# Patient Record
Sex: FEMALE | Race: WHITE | NOT HISPANIC OR LATINO | Employment: FULL TIME | URBAN - METROPOLITAN AREA
[De-identification: names, ages, dates, MRNs, and addresses within clinical notes are randomized per-mention and may not be internally consistent; named-entity substitution may affect disease eponyms.]

---

## 2019-03-24 ENCOUNTER — HOSPITAL ENCOUNTER (EMERGENCY)
Facility: OTHER | Age: 44
Discharge: HOME | End: 2019-03-24
Attending: EMERGENCY MEDICINE
Payer: COMMERCIAL

## 2019-03-24 ENCOUNTER — OFFICE VISIT (OUTPATIENT)
Dept: URGENT CARE | Facility: CLINIC | Age: 44
End: 2019-03-24
Payer: COMMERCIAL

## 2019-03-24 VITALS
OXYGEN SATURATION: 98 % | RESPIRATION RATE: 12 BRPM | WEIGHT: 220 LBS | BODY MASS INDEX: 30.8 KG/M2 | SYSTOLIC BLOOD PRESSURE: 107 MMHG | TEMPERATURE: 98 F | HEIGHT: 71 IN | HEART RATE: 97 BPM | DIASTOLIC BLOOD PRESSURE: 71 MMHG

## 2019-03-24 VITALS
WEIGHT: 220 LBS | DIASTOLIC BLOOD PRESSURE: 70 MMHG | BODY MASS INDEX: 30.8 KG/M2 | OXYGEN SATURATION: 97 % | RESPIRATION RATE: 18 BRPM | SYSTOLIC BLOOD PRESSURE: 127 MMHG | HEIGHT: 71 IN | HEART RATE: 81 BPM | TEMPERATURE: 99 F

## 2019-03-24 DIAGNOSIS — S49.91XA ACROMIOCLAVICULAR JOINT INJURY, RIGHT, INITIAL ENCOUNTER: Primary | ICD-10-CM

## 2019-03-24 DIAGNOSIS — S49.91XA RIGHT SHOULDER INJURY, INITIAL ENCOUNTER: Primary | ICD-10-CM

## 2019-03-24 LAB
B-HCG UR QL: NEGATIVE
CTP QC/QA: YES

## 2019-03-24 PROCEDURE — 73030 XR SHOULDER COMPLETE 2 OR MORE VIEWS RIGHT: ICD-10-PCS | Mod: FY,RT,S$GLB, | Performed by: RADIOLOGY

## 2019-03-24 PROCEDURE — 73030 X-RAY EXAM OF SHOULDER: CPT | Mod: FY,RT,S$GLB, | Performed by: RADIOLOGY

## 2019-03-24 PROCEDURE — 96372 THER/PROPH/DIAG INJ SC/IM: CPT

## 2019-03-24 PROCEDURE — 99204 OFFICE O/P NEW MOD 45 MIN: CPT | Mod: S$GLB,,, | Performed by: NURSE PRACTITIONER

## 2019-03-24 PROCEDURE — 25000003 PHARM REV CODE 250: Performed by: EMERGENCY MEDICINE

## 2019-03-24 PROCEDURE — 3008F BODY MASS INDEX DOCD: CPT | Mod: CPTII,S$GLB,, | Performed by: NURSE PRACTITIONER

## 2019-03-24 PROCEDURE — 99204 PR OFFICE/OUTPT VISIT, NEW, LEVL IV, 45-59 MIN: ICD-10-PCS | Mod: S$GLB,,, | Performed by: NURSE PRACTITIONER

## 2019-03-24 PROCEDURE — 63600175 PHARM REV CODE 636 W HCPCS: Performed by: EMERGENCY MEDICINE

## 2019-03-24 PROCEDURE — 3008F PR BODY MASS INDEX (BMI) DOCUMENTED: ICD-10-PCS | Mod: CPTII,S$GLB,, | Performed by: NURSE PRACTITIONER

## 2019-03-24 PROCEDURE — 99284 EMERGENCY DEPT VISIT MOD MDM: CPT | Mod: 25

## 2019-03-24 PROCEDURE — 81025 URINE PREGNANCY TEST: CPT | Performed by: EMERGENCY MEDICINE

## 2019-03-24 RX ORDER — AMLODIPINE BESYLATE 5 MG/1
TABLET ORAL
Refills: 1 | COMMUNITY
Start: 2018-12-31

## 2019-03-24 RX ORDER — OLMESARTAN MEDOXOMIL AND HYDROCHLOROTHIAZIDE 20/12.5 20; 12.5 MG/1; MG/1
TABLET ORAL
Refills: 0 | COMMUNITY
Start: 2019-02-24

## 2019-03-24 RX ORDER — IBUPROFEN AND FAMOTIDINE 800; 26.6 MG/1; MG/1
TABLET, COATED ORAL
Refills: 11 | COMMUNITY
Start: 2019-01-07 | End: 2019-03-24

## 2019-03-24 RX ORDER — IBUPROFEN 800 MG/1
800 TABLET ORAL EVERY 6 HOURS PRN
Qty: 30 TABLET | Refills: 0 | Status: SHIPPED | OUTPATIENT
Start: 2019-03-24

## 2019-03-24 RX ORDER — AZELASTINE HYDROCHLORIDE AND FLUTICASONE PROPIONATE 137; 50 UG/1; UG/1
SPRAY, METERED NASAL
Refills: 1 | COMMUNITY
Start: 2019-01-23

## 2019-03-24 RX ORDER — TIZANIDINE 4 MG/1
TABLET ORAL
Refills: 2 | COMMUNITY
Start: 2019-02-19

## 2019-03-24 RX ORDER — KETOROLAC TROMETHAMINE 30 MG/ML
30 INJECTION, SOLUTION INTRAMUSCULAR; INTRAVENOUS
Status: COMPLETED | OUTPATIENT
Start: 2019-03-24 | End: 2019-03-24

## 2019-03-24 RX ORDER — NORETHINDRONE, ETHINYL ESTRADIOL, AND FERROUS FUMARATE 0.8-25(24)
KIT ORAL
Refills: 1 | COMMUNITY
Start: 2019-01-18 | End: 2023-02-03 | Stop reason: SDUPTHER

## 2019-03-24 RX ORDER — OXYCODONE AND ACETAMINOPHEN 5; 325 MG/1; MG/1
2 TABLET ORAL
Status: COMPLETED | OUTPATIENT
Start: 2019-03-24 | End: 2019-03-24

## 2019-03-24 RX ORDER — HYDROCODONE BITARTRATE AND ACETAMINOPHEN 5; 325 MG/1; MG/1
1 TABLET ORAL EVERY 6 HOURS PRN
Qty: 10 TABLET | Refills: 0 | Status: SHIPPED | OUTPATIENT
Start: 2019-03-24

## 2019-03-24 RX ADMIN — KETOROLAC TROMETHAMINE 30 MG: 30 INJECTION, SOLUTION INTRAMUSCULAR; INTRAVENOUS at 06:03

## 2019-03-24 RX ADMIN — OXYCODONE HYDROCHLORIDE AND ACETAMINOPHEN 2 TABLET: 5; 325 TABLET ORAL at 06:03

## 2019-03-24 NOTE — PATIENT INSTRUCTIONS
"                              Orthopedic Discharge Instructions  If your condition worsens or fails to improve we recommend that you receive another evaluation at the ER immediately or contact your PCP to discuss your concerns or return here. You must understand that you've received an urgent care treatment only and that you may be released before all your medical problems are known or treated. You the patient will arrange for follouwp care as instructed.   Follow up with your Orthopedist within the next 48-72hrs for more evaluation and management of your condition.    If you were prescribed a narcotic or muscle relaxant do not drive or operate heavy machinery while taking these medications   Tylenol can also be used as directed for pain unless you have an allergy to them or medical condition such as stomach ulcers, kidney or liver disease or blood thinners etc for which you should not be taking these type of medications.   Avoid NSAIDs like Ibuprofen, Aleve, Advil, Motrin, etc.; as they can cause a delay in healing of the bone.    RICE which means rest, ice compression and elevation are helpful.   If you were given a splint wear it at all times.   If you were given crutches use them as we instructed. Do not rest your armpits on the foam pad or you risk compressing the nerves and the vessels there   You may take over the counter Calcium 1000 mg daily and Vitamin D3 600 IU daily for 4-6 weeks to help with the bone healing process. If you are not allergic or do not have any contraindications.     Narcotic Warning:  You were prescribed a narcotic medication to be taken for pain over "5 out of 10" on a pain scale of "0-10".  Please be aware as we discussed that narcotics can be addictive. I have given you a limited quantity to take as it is needed at this time. However take it sparingly and only when needed.    No driving, drinking or operating heavy machinery while taking this medication.     General Referral to " "[facility]  You were referred to [facility] to Establish Care and Management of your condition.  Please call [phone] to schedule your appointment.    Please return here or go to the Emergency Department for any concerns or worsening of condition.  Please follow up with your primary care doctor or specialist in the next 48-72hrs as needed.    If you  smoke, please stop smoking.    Parts of the Shoulder  The shoulder is the most flexible part of the body. The main joint in the shoulder is called the glenohumeral joint. This is where the arm bone (humerus) rests in a shallow socket called the glenoid. The bones in the shoulder are connected by ligaments, muscles, and other strong tissues. When the shoulder is healthy, you can move your arm in almost any direction (a full range of motion).    Date Last Reviewed: 9/10/2015  © 6145-4689 [employer]. [address] [place] [address]. All rights reserved. This information is not intended as a substitute for professional medical care. Always follow your healthcare professional's instructions.        Sling    A sling is designed to support your arm in a position of rest. It is used for injuries of the hand, forearm, upper arm, and shoulder.  A shoulder that is immobilized too long can become stiff and lose range of motion. Follow up with your doctor as advised and do not use the sling longer than directed.    Home use:  · Leave the sling in place as long as directed by your doctor. Unless told otherwise, you may remove it when bathing, dressing, and when you go to sleep.  · If approved by your health care provider, you can do gentle "pendulum exercises." To do these:  ¨ Remove your sling.  ¨ Stand or sit with your arm vertical and close to your side.  ¨ Relax your shoulder muscles and gently swing the arm forward and back, side to side, and in small circles.  ¨ Do this for about 5 minutes once or twice a day.  There should be only " minimal pain with this exercise. If you experience more than minimal discomfort, stop and call your health care provider.  · The sling is adjustable. If it becomes loose, adjust it so that your forearm is horizontal (level with the ground). Your hand should be level with the elbow.  Date Last Reviewed: 9/28/2015  © 0137-2452 [employer]. [address] [place] [address], [place] [address]. All rights reserved. This information is not intended as a substitute for professional medical care. Always follow your healthcare professional's instructions.

## 2019-03-24 NOTE — ED PROVIDER NOTES
"Encounter Date: 3/24/2019    SCRIBE #1 NOTE: I, Madina Alvarez, am scribing for, and in the presence of, Dr. Cloud.       History     Chief Complaint   Patient presents with    shoulder dislocation     states she went to [facility] on [facility] and was told she had R shoulder dislocation.  Pt states, "They were unable to put it back.  They said that if it was hurting to much I needed to go to the ER."  Pt states she hurt her shoulder s/p slip and fall     This is a 43 y.o. female who presents with complaint of right shoulder pain that began at 3:00 AM. The patient states she tripped getting out a car and fell onto her right shoulder. She was seen at [facility] and states she was told to come to the ED if she had increased pain because "thay can do more in the ED." Per patient, she was not clear on her primary diagnosis after leaving Urgent Care. She reports a bone is "poking out" of her shoulder. She also reports abrasion to right forehead. She denies fever, chills, nausea, vomiting, headache, numbness or tingling. Patient has a history of HTN, and is compliant with her medications. She denies history of diabetes, heart disease, or kidney disease.         Review of patient's allergies indicates:  No Known Allergies  Past Medical History:   Diagnosis Date    Allergy     Hypertension      Past Surgical History:   Procedure Laterality Date    GASTRIC BYPASS      KNEE SURGERY Right      Family History   Problem Relation Age of Onset    Hypertension Mother      Social History     Tobacco Use    Smoking status: Never Smoker    Smokeless tobacco: Never Used   Substance Use Topics    Alcohol use: Yes     Frequency: 2-4 times a month     Drinks per session: 1 or 2    Drug use: Never     Review of Systems   Constitutional: Negative for chills and fever.   HENT: Negative for congestion, sore throat and trouble swallowing.    Eyes: Negative for visual disturbance.   Respiratory: Negative for " cough and shortness of breath.    Cardiovascular: Negative for chest pain.   Gastrointestinal: Negative for abdominal pain, nausea and vomiting.   Endocrine: Negative.    Genitourinary: Negative for dysuria and hematuria.   Musculoskeletal: Positive for arthralgias.   Skin: Positive for wound.   Neurological: Negative for numbness and headaches.   Psychiatric/Behavioral: Negative.        Physical Exam     Initial Vitals [03/24/19 1712]   BP Pulse Resp Temp SpO2   137/84 96 16 98.7 °F (37.1 °C) 98 %      MAP       --         Physical Exam    Nursing note and vitals reviewed.  Constitutional: She appears well-developed and well-nourished.   HENT:   Head: Normocephalic.   Very small contusion right lateral forehead. No other cranial facial trauma.    Eyes: Conjunctivae and EOM are normal. Pupils are equal, round, and reactive to light.   Pulmonary/Chest: No respiratory distress.   Musculoskeletal:   Tenderness soft tissue and small abrasion over right superior shoulder. Prominent of right AC joint. Minor abrasion over superior scapula. No tenderness of remainder of clavicle. No deformity of shoulder joint. Elbow and wrist normal. NVID.   Neurological: She is alert and oriented to person, place, and time.   Skin: Skin is dry. No rash noted.   Psychiatric: She has a normal mood and affect. Her behavior is normal. Judgment and thought content normal.         ED Course   Procedures  Labs Reviewed   POCT URINE PREGNANCY          Imaging Results    None          Medical Decision Making:   Clinical Tests:   Lab Tests: Ordered and Reviewed  ED Management:  Shoulder X-ray obtain from Urgent care was independently interpreted: No fracture. No dislocation. Distal end of clavicle high riding, but no contralateral view available for comparison.            Scribe Attestation:   Scribe #1: I performed the above scribed service and the documentation accurately describes the services I performed. I attest to the accuracy of the  note.    Attending Attestation:           Physician Attestation for Scribe:  Physician Attestation Statement for Scribe #1: I, Dr. Cloud, reviewed documentation, as scribed by Madina Alvarez in my presence, and it is both accurate and complete.           Patient presents due to persistent right shoulder pain. Fell on it last night.  She thinks she landed on the lateral aspect of the shoulder with her arm against her chest, not a FOOSH injury.  She was seen at urgent care, x-ray was obtained and does not show dislocation or fracture.  The chart indicates that they were concerned for AC separation.  In talking with the patient she presented to the ER under the impression that there was a dislocation and that the ER might have to put something back in place.  She shows confusion about her diagnosis and discharge instructions.  I reviewed the images and concur.  Her exam does show greatest tenderness in the region of the AC and I suspect AC ligament strain/separation.  However no intervention is required she is already in a splint will be given IM an oral dose of analgesics here for improved pain relief encouraged follow-up with Orthopedics.  Return precautions discussed.             Clinical Impression:     1. Acromioclavicular joint injury, right, initial encounter                                 Clay Cloud II, MD  03/24/19 9497

## 2019-03-24 NOTE — PROGRESS NOTES
"Subjective:       Patient ID: Maria D Reid is a 43 y.o. female.    Vitals:  height is 5' 11" (1.803 m) and weight is 99.8 kg (220 lb). Her tympanic temperature is 98.2 °F (36.8 °C). Her blood pressure is 107/71 and her pulse is 97. Her respiration is 12 and oxygen saturation is 98%.     Chief Complaint: Shoulder Injury    Patient presents right shoulder pain due to an injury that occurred yesterday.  She states that she fell in the street, and hit her right shoulder against the concrete.  She has not tried any medication for her pain.     Shoulder Injury    The incident occurred in the street. The right shoulder is affected. The incident occurred 12 to 24 hours ago. The injury mechanism was a fall and a direct blow. The quality of the pain is described as stabbing and aching. The pain does not radiate. The pain is at a severity of 10/10. The pain is severe. Associated symptoms include numbness. Pertinent negatives include no chest pain, muscle weakness or tingling. The symptoms are aggravated by movement and overhead lifting. She has tried nothing for the symptoms.       Constitution: Negative for fatigue.   HENT: Negative for facial swelling and facial trauma.    Neck: Negative for neck stiffness.   Cardiovascular: Negative for chest trauma and chest pain.   Eyes: Negative for eye trauma, double vision and blurred vision.   Gastrointestinal: Negative for abdominal trauma, abdominal pain and rectal bleeding.   Genitourinary: Negative for hematuria, missed menses, genital trauma and pelvic pain.   Musculoskeletal: Positive for pain, trauma, joint pain and joint swelling. Negative for abnormal ROM of joint.   Skin: Negative for color change, wound, abrasion, laceration and bruising.   Neurological: Positive for numbness. Negative for dizziness, history of vertigo, light-headedness, coordination disturbances, altered mental status and loss of consciousness.   Hematologic/Lymphatic: Negative for history of bleeding " disorder.   Psychiatric/Behavioral: Negative for altered mental status.       Objective:      Physical Exam   Constitutional: She is oriented to person, place, and time. Vital signs are normal. She appears well-developed and well-nourished. She is cooperative.  Non-toxic appearance. She does not have a sickly appearance. She does not appear ill. No distress.   HENT:   Head: Normocephalic and atraumatic.   Right Ear: Hearing, tympanic membrane, external ear and ear canal normal.   Left Ear: Hearing, tympanic membrane, external ear and ear canal normal.   Nose: Nose normal. No mucosal edema, rhinorrhea or nasal deformity. No epistaxis. Right sinus exhibits no maxillary sinus tenderness and no frontal sinus tenderness. Left sinus exhibits no maxillary sinus tenderness and no frontal sinus tenderness.   Mouth/Throat: Uvula is midline, oropharynx is clear and moist and mucous membranes are normal. No trismus in the jaw. Normal dentition. No uvula swelling. No posterior oropharyngeal erythema.   Eyes: Pupils are equal, round, and reactive to light. Conjunctivae, EOM and lids are normal. Right eye exhibits no discharge. Left eye exhibits no discharge. No scleral icterus.   Sclera clear bilat   Neck: Trachea normal, normal range of motion, full passive range of motion without pain and phonation normal. Neck supple.   Cardiovascular: Normal rate, regular rhythm, S1 normal, S2 normal, normal heart sounds, intact distal pulses and normal pulses.   Pulmonary/Chest: Effort normal and breath sounds normal. No respiratory distress. She has no decreased breath sounds. She has no wheezes. She has no rhonchi. She has no rales.   Abdominal: Soft. Normal appearance and bowel sounds are normal. She exhibits no distension, no pulsatile midline mass and no mass. There is no tenderness.   Musculoskeletal: She exhibits no edema or deformity.        Right shoulder: She exhibits decreased range of motion, tenderness, bony tenderness, pain and  decreased strength. She exhibits no swelling, no effusion, no crepitus, no deformity, no laceration, no spasm and normal pulse.        Arms:  Lymphadenopathy:     She has no cervical adenopathy.   Neurological: She is alert and oriented to person, place, and time. She exhibits normal muscle tone. Coordination normal.   Skin: Skin is warm, dry and intact. No rash noted. She is not diaphoretic. No pallor.   Psychiatric: She has a normal mood and affect. Her speech is normal and behavior is normal. Judgment and thought content normal. Cognition and memory are normal.   Nursing note and vitals reviewed.                    Xr Shoulder Complete 2 Or More Views Right    Result Date: 3/24/2019  EXAMINATION: XR SHOULDER COMPLETE 2 OR MORE VIEWS RIGHT CLINICAL HISTORY: Unspecified injury of right shoulder and upper arm, initial encounter TECHNIQUE: Two or three views of the right shoulder were performed. COMPARISON: None FINDINGS: Three views. The right humeral head maintains appropriate relationship with the glenoid.  No acute displaced right rib fracture.  The right lung zones are grossly clear.  There is slight elevation of the acromioclavicular joint, correlation with any focal tenderness recommended.     1. No acute displaced fracture or dislocation of the glenohumeral joint. 2. Slight elevation of the right acromioclavicular joint, may reflect mild separation, correlation advised. Electronically signed by: Palmer Venegas MD Date:    03/24/2019 Time:    14:32  Assessment:       1. Right shoulder injury, initial encounter        Plan:       An urgent care referral to Orthopedics was ordered on today.  Left message for [facility] at 3:04pm  on 3/24/19 to follow up with my patient regarding scheduling an appointment.      Right shoulder injury, initial encounter  -     XR SHOULDER COMPLETE 2 OR MORE VIEWS RIGHT; Future; Expected date: 03/24/2019  -     Ambulatory referral to Orthopedics  -      "HYDROcodone-acetaminophen (NORCO) 5-325 mg per tablet; Take 1 tablet by mouth every 6 (six) hours as needed for Pain.  Dispense: 10 tablet; Refill: 0  -     SLING FOR HOME USE      Patient Instructions                                 Orthopedic Discharge Instructions  If your condition worsens or fails to improve we recommend that you receive another evaluation at the ER immediately or contact your PCP to discuss your concerns or return here. You must understand that you've received an urgent care treatment only and that you may be released before all your medical problems are known or treated. You the patient will arrange for follouwp care as instructed.   Follow up with your Orthopedist within the next 48-72hrs for more evaluation and management of your condition.    If you were prescribed a narcotic or muscle relaxant do not drive or operate heavy machinery while taking these medications   Tylenol can also be used as directed for pain unless you have an allergy to them or medical condition such as stomach ulcers, kidney or liver disease or blood thinners etc for which you should not be taking these type of medications.   Avoid NSAIDs like Ibuprofen, Aleve, Advil, Motrin, etc.; as they can cause a delay in healing of the bone.    RICE which means rest, ice compression and elevation are helpful.   If you were given a splint wear it at all times.   If you were given crutches use them as we instructed. Do not rest your armpits on the foam pad or you risk compressing the nerves and the vessels there   You may take over the counter Calcium 1000 mg daily and Vitamin D3 600 IU daily for 4-6 weeks to help with the bone healing process. If you are not allergic or do not have any contraindications.     Narcotic Warning:  You were prescribed a narcotic medication to be taken for pain over "5 out of 10" on a pain scale of "0-10".  Please be aware as we discussed that narcotics can be addictive. I have given you a limited " "quantity to take as it is needed at this time. However take it sparingly and only when needed.    No driving, drinking or operating heavy machinery while taking this medication.     General Referral to [facility]  You were referred to [facility] to Establish Care and Management of your condition.  Please call [phone] to schedule your appointment.    Please return here or go to the Emergency Department for any concerns or worsening of condition.  Please follow up with your primary care doctor or specialist in the next 48-72hrs as needed.    If you  smoke, please stop smoking.    Parts of the Shoulder  The shoulder is the most flexible part of the body. The main joint in the shoulder is called the glenohumeral joint. This is where the arm bone (humerus) rests in a shallow socket called the glenoid. The bones in the shoulder are connected by ligaments, muscles, and other strong tissues. When the shoulder is healthy, you can move your arm in almost any direction (a full range of motion).    Date Last Reviewed: 9/10/2015  © 1130-9956 [employer]. [address] [place] [address]. All rights reserved. This information is not intended as a substitute for professional medical care. Always follow your healthcare professional's instructions.        Sling    A sling is designed to support your arm in a position of rest. It is used for injuries of the hand, forearm, upper arm, and shoulder.  A shoulder that is immobilized too long can become stiff and lose range of motion. Follow up with your doctor as advised and do not use the sling longer than directed.    Home use:  · Leave the sling in place as long as directed by your doctor. Unless told otherwise, you may remove it when bathing, dressing, and when you go to sleep.  · If approved by your health care provider, you can do gentle "pendulum exercises." To do these:  ¨ Remove your sling.  ¨ Stand or sit with your arm vertical and " close to your side.  ¨ Relax your shoulder muscles and gently swing the arm forward and back, side to side, and in small circles.  ¨ Do this for about 5 minutes once or twice a day.  There should be only minimal pain with this exercise. If you experience more than minimal discomfort, stop and call your health care provider.  · The sling is adjustable. If it becomes loose, adjust it so that your forearm is horizontal (level with the ground). Your hand should be level with the elbow.  Date Last Reviewed: 9/28/2015 © 2000-2017 [employer]. [address] [place] [address]. All rights reserved. This information is not intended as a substitute for professional medical care. Always follow your healthcare professional's instructions.

## 2019-03-24 NOTE — ED TRIAGE NOTES
Pt presents to the ED with c/o right shoulder pain that started last night after a trip and fall. Pt states that she was seen at [facility] on [address] today and was told to come to ED if pain worsen. Pt states that she had 3 x-ray performed today at  and was told that she may have a torn ligament to left shoulder. Pt rates pain as 10/10. Pt arm placed in sling. Neurovascular intact.

## 2019-03-25 ENCOUNTER — OFFICE VISIT (OUTPATIENT)
Dept: ORTHOPEDICS | Facility: CLINIC | Age: 44
End: 2019-03-25
Payer: COMMERCIAL

## 2019-03-25 ENCOUNTER — HOSPITAL ENCOUNTER (OUTPATIENT)
Dept: RADIOLOGY | Facility: HOSPITAL | Age: 44
Discharge: HOME | End: 2019-03-25
Attending: PHYSICIAN ASSISTANT
Payer: COMMERCIAL

## 2019-03-25 VITALS
HEIGHT: 71 IN | SYSTOLIC BLOOD PRESSURE: 128 MMHG | WEIGHT: 223.44 LBS | DIASTOLIC BLOOD PRESSURE: 82 MMHG | BODY MASS INDEX: 31.28 KG/M2 | HEART RATE: 82 BPM

## 2019-03-25 DIAGNOSIS — M25.511 ACUTE PAIN OF RIGHT SHOULDER: Primary | ICD-10-CM

## 2019-03-25 DIAGNOSIS — M25.512 LEFT SHOULDER PAIN, UNSPECIFIED CHRONICITY: ICD-10-CM

## 2019-03-25 DIAGNOSIS — M25.311 INSTABILITY OF RIGHT SHOULDER JOINT: ICD-10-CM

## 2019-03-25 PROCEDURE — 73030 X-RAY EXAM OF SHOULDER: CPT | Mod: TC,LT

## 2019-03-25 PROCEDURE — 73030 XR SHOULDER COMPLETE 2 OR MORE VIEWS LEFT: ICD-10-PCS | Mod: 26,LT,, | Performed by: RADIOLOGY

## 2019-03-25 PROCEDURE — 99203 PR OFFICE/OUTPT VISIT, NEW, LEVL III, 30-44 MIN: ICD-10-PCS | Mod: S$GLB,,, | Performed by: PHYSICIAN ASSISTANT

## 2019-03-25 PROCEDURE — 3008F BODY MASS INDEX DOCD: CPT | Mod: CPTII,S$GLB,, | Performed by: PHYSICIAN ASSISTANT

## 2019-03-25 PROCEDURE — 99999 PR PBB SHADOW E&M-EST. PATIENT-LVL IV: CPT | Mod: PBBFAC,,, | Performed by: PHYSICIAN ASSISTANT

## 2019-03-25 PROCEDURE — 99999 PR PBB SHADOW E&M-EST. PATIENT-LVL IV: ICD-10-PCS | Mod: PBBFAC,,, | Performed by: PHYSICIAN ASSISTANT

## 2019-03-25 PROCEDURE — 99203 OFFICE O/P NEW LOW 30 MIN: CPT | Mod: S$GLB,,, | Performed by: PHYSICIAN ASSISTANT

## 2019-03-25 PROCEDURE — 73030 X-RAY EXAM OF SHOULDER: CPT | Mod: 26,LT,, | Performed by: RADIOLOGY

## 2019-03-25 PROCEDURE — 3008F PR BODY MASS INDEX (BMI) DOCUMENTED: ICD-10-PCS | Mod: CPTII,S$GLB,, | Performed by: PHYSICIAN ASSISTANT

## 2019-03-25 NOTE — LETTER
March 25, 2019      Ferny Patten, NP  [address] [facility] [address]           [facility]  [address]  [facility] [address]  Phone: [phone]          Patient: Maria D Reid   MR Number: [id number]   YOB: 1975   Date of Visit: 3/25/2019       Dear Ferny Patten:    Thank you for referring Maria D Reid to me for evaluation. Attached you will find relevant portions of my assessment and plan of care.    If you have questions, please do not hesitate to call me. I look forward to following Maria D Reid along with you.    Sincerely,    Rommel Arias PA-C    Enclosure  CC:  No Recipients    If you would like to receive this communication electronically, please contact [email or web address] or [phone] to request more information on [employer] Link access.    For providers and/or their staff who would like to refer a patient to [facility], please contact us through our one-stop-shop provider referral line, [facility], at [phone].    If you feel you have received this communication in error or would no longer like to receive these types of communications, please e-mail [email or web address]
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
## 2019-03-28 ENCOUNTER — HOSPITAL ENCOUNTER (OUTPATIENT)
Dept: RADIOLOGY | Facility: HOSPITAL | Age: 44
Discharge: HOME | End: 2019-03-28
Attending: PHYSICIAN ASSISTANT
Payer: COMMERCIAL

## 2019-03-28 DIAGNOSIS — M25.511 ACUTE PAIN OF RIGHT SHOULDER: ICD-10-CM

## 2019-03-28 DIAGNOSIS — M25.311 INSTABILITY OF RIGHT SHOULDER JOINT: ICD-10-CM

## 2019-03-28 PROCEDURE — 73221 MRI JOINT UPR EXTREM W/O DYE: CPT | Mod: TC,RT

## 2019-03-28 PROCEDURE — 73221 MRI JOINT UPR EXTREM W/O DYE: CPT | Mod: 26,RT,, | Performed by: RADIOLOGY

## 2019-03-28 PROCEDURE — 73221 MRI SHOULDER WITHOUT CONTRAST RIGHT: ICD-10-PCS | Mod: 26,RT,, | Performed by: RADIOLOGY

## 2019-04-02 ENCOUNTER — TELEPHONE (OUTPATIENT)
Dept: ORTHOPEDICS | Facility: CLINIC | Age: 44
End: 2019-04-02

## 2019-04-02 NOTE — TELEPHONE ENCOUNTER
----- Message from Rekha Aceves sent at 4/2/2019  8:41 AM CDT -----  Contact: self  Pt is returning a call from Shefali.     Please call pt back [phone]

## 2019-04-04 ENCOUNTER — OFFICE VISIT (OUTPATIENT)
Dept: SPORTS MEDICINE | Facility: CLINIC | Age: 44
End: 2019-04-04
Payer: COMMERCIAL

## 2019-04-04 ENCOUNTER — HOSPITAL ENCOUNTER (OUTPATIENT)
Dept: RADIOLOGY | Facility: HOSPITAL | Age: 44
Discharge: HOME | End: 2019-04-04
Attending: ORTHOPAEDIC SURGERY
Payer: COMMERCIAL

## 2019-04-04 VITALS
WEIGHT: 223 LBS | DIASTOLIC BLOOD PRESSURE: 85 MMHG | HEIGHT: 71 IN | BODY MASS INDEX: 31.22 KG/M2 | SYSTOLIC BLOOD PRESSURE: 146 MMHG | HEART RATE: 69 BPM

## 2019-04-04 DIAGNOSIS — M75.21 BICEPS TENDINITIS OF RIGHT UPPER EXTREMITY: ICD-10-CM

## 2019-04-04 DIAGNOSIS — M25.511 ACUTE PAIN OF RIGHT SHOULDER: ICD-10-CM

## 2019-04-04 DIAGNOSIS — M25.512 LEFT SHOULDER PAIN, UNSPECIFIED CHRONICITY: ICD-10-CM

## 2019-04-04 DIAGNOSIS — S43.51XA SPRAIN OF ACROMIOCLAVICULAR LIGAMENT OF RIGHT SHOULDER, INITIAL ENCOUNTER: ICD-10-CM

## 2019-04-04 DIAGNOSIS — M75.121 COMPLETE TEAR OF RIGHT ROTATOR CUFF: Primary | ICD-10-CM

## 2019-04-04 PROCEDURE — 3008F PR BODY MASS INDEX (BMI) DOCUMENTED: ICD-10-PCS | Mod: CPTII,S$GLB,, | Performed by: ORTHOPAEDIC SURGERY

## 2019-04-04 PROCEDURE — 99999 PR PBB SHADOW E&M-EST. PATIENT-LVL III: CPT | Mod: PBBFAC,,, | Performed by: ORTHOPAEDIC SURGERY

## 2019-04-04 PROCEDURE — 73050 XR ACROMIOCLAVICULAR JOINTS BILATERAL W WO WEIGHTS: ICD-10-PCS | Mod: 26,,, | Performed by: RADIOLOGY

## 2019-04-04 PROCEDURE — 99204 PR OFFICE/OUTPT VISIT, NEW, LEVL IV, 45-59 MIN: ICD-10-PCS | Mod: S$GLB,,, | Performed by: ORTHOPAEDIC SURGERY

## 2019-04-04 PROCEDURE — 99204 OFFICE O/P NEW MOD 45 MIN: CPT | Mod: S$GLB,,, | Performed by: ORTHOPAEDIC SURGERY

## 2019-04-04 PROCEDURE — 3008F BODY MASS INDEX DOCD: CPT | Mod: CPTII,S$GLB,, | Performed by: ORTHOPAEDIC SURGERY

## 2019-04-04 PROCEDURE — 73050 X-RAY EXAM OF SHOULDERS: CPT | Mod: TC,FY,PO

## 2019-04-04 PROCEDURE — 73050 X-RAY EXAM OF SHOULDERS: CPT | Mod: 26,,, | Performed by: RADIOLOGY

## 2019-04-04 PROCEDURE — 99999 PR PBB SHADOW E&M-EST. PATIENT-LVL III: ICD-10-PCS | Mod: PBBFAC,,, | Performed by: ORTHOPAEDIC SURGERY

## 2019-04-04 NOTE — LETTER
April 8, 2019      Rommel Arias PA-C  [address]  [facility] [address]           [facility]  [address]  [facility] [address]  Phone: [phone]          Patient: Maria D Reid   MR Number: [id number]   YOB: 1975   Date of Visit: 4/4/2019       Dear Rommel Arias:    Thank you for referring Maria D Reid to me for evaluation. Attached you will find relevant portions of my assessment and plan of care.    If you have questions, please do not hesitate to call me. I look forward to following Maria D Reid along with you.    Sincerely,    JEREMÍAS Milligan MD    Enclosure  CC:  No Recipients    If you would like to receive this communication electronically, please contact [email or web address] or [phone] to request more information on [facility] access.    For providers and/or their staff who would like to refer a patient to [facility], please contact us through our one-stop-shop provider referral line, Sarahy Dugan, at [phone].    If you feel you have received this communication in error or would no longer like to receive these types of communications, please e-mail [email or web address]
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
## 2019-04-05 DIAGNOSIS — S46.011A STRAIN OF RIGHT ROTATOR CUFF CAPSULE, INITIAL ENCOUNTER: Primary | ICD-10-CM

## 2019-04-16 ENCOUNTER — OFFICE VISIT (OUTPATIENT)
Dept: SPORTS MEDICINE | Facility: CLINIC | Age: 44
End: 2019-04-16
Payer: COMMERCIAL

## 2019-04-16 VITALS
HEART RATE: 83 BPM | SYSTOLIC BLOOD PRESSURE: 133 MMHG | WEIGHT: 223 LBS | BODY MASS INDEX: 33.8 KG/M2 | DIASTOLIC BLOOD PRESSURE: 99 MMHG | HEIGHT: 68 IN

## 2019-04-16 DIAGNOSIS — M75.111 INCOMPLETE TEAR OF RIGHT ROTATOR CUFF: Primary | ICD-10-CM

## 2019-04-16 PROCEDURE — 99499 UNLISTED E&M SERVICE: CPT | Mod: S$GLB,,, | Performed by: PHYSICIAN ASSISTANT

## 2019-04-16 PROCEDURE — 99999 PR PBB SHADOW E&M-EST. PATIENT-LVL III: CPT | Mod: PBBFAC,,, | Performed by: PHYSICIAN ASSISTANT

## 2019-04-16 PROCEDURE — 99999 PR PBB SHADOW E&M-EST. PATIENT-LVL III: ICD-10-PCS | Mod: PBBFAC,,, | Performed by: PHYSICIAN ASSISTANT

## 2019-04-16 PROCEDURE — 99499 NO LOS: ICD-10-PCS | Mod: S$GLB,,, | Performed by: PHYSICIAN ASSISTANT

## 2019-04-16 RX ORDER — OXYCODONE HYDROCHLORIDE 5 MG/1
5-10 TABLET ORAL EVERY 6 HOURS PRN
Qty: 28 TABLET | Refills: 0 | Status: SHIPPED | OUTPATIENT
Start: 2019-04-16

## 2019-04-16 RX ORDER — ASPIRIN 81 MG/1
81 TABLET ORAL 2 TIMES DAILY
Qty: 28 TABLET | Refills: 0 | Status: SHIPPED | OUTPATIENT
Start: 2019-04-16 | End: 2019-04-30

## 2019-04-16 RX ORDER — ONDANSETRON 4 MG/1
4 TABLET, FILM COATED ORAL EVERY 8 HOURS PRN
Qty: 30 TABLET | Refills: 0 | Status: SHIPPED | OUTPATIENT
Start: 2019-04-16 | End: 2019-04-23

## 2019-04-16 NOTE — H&P (VIEW-ONLY)
Maria D Reid  is here for a completion of her perioperative paperwork. she  Is scheduled to undergo  RIGHT shoulder arthroscopic rotator cuff repair, SAD, and possible biceps tenodesis.     on 4/24/2019.  She is a healthy individual and does not need clearance for this procedure.      Risks, indications and benefits of the surgical procedure were discussed with the patient. All questions with regard to surgery, rehab, expected return to functional activities, activities of daily living and recreational endeavors were answered to her satisfaction.    Patient was informed and understands the risks of surgery are greater for patients with a current condition or hx of heart disease, obesity, clotting disorders, recurrent infections, steroid use, current or past smoking, and factors such as sedentary lifestyle and noncompliance with medications, therapy or f/u. The degree of the increased risk is hard to estimate w/ any degree of precision.    Once no other questions were asked, a brief history and physical exam was then performed.    PAST MEDICAL HISTORY:   Past Medical History:   Diagnosis Date    Allergy     Hypertension      PAST SURGICAL HISTORY:   Past Surgical History:   Procedure Laterality Date    GASTRIC BYPASS      KNEE SURGERY Right      FAMILY HISTORY:   Family History   Problem Relation Age of Onset    Hypertension Mother      SOCIAL HISTORY:   Social History     Socioeconomic History    Marital status: Single     Spouse name: Not on file    Number of children: Not on file    Years of education: Not on file    Highest education level: Not on file   Occupational History    Not on file   Social Needs    Financial resource strain: Not on file    Food insecurity:     Worry: Not on file     Inability: Not on file    Transportation needs:     Medical: Not on file     Non-medical: Not on file   Tobacco Use    Smoking status: Never Smoker    Smokeless tobacco: Never Used   Substance and Sexual  Activity    Alcohol use: Yes     Frequency: 2-4 times a month     Drinks per session: 1 or 2    Drug use: Never    Sexual activity: Not on file   Lifestyle    Physical activity:     Days per week: Not on file     Minutes per session: Not on file    Stress: Not on file   Relationships    Social connections:     Talks on phone: Not on file     Gets together: Not on file     Attends Spiritism service: Not on file     Active member of club or organization: Not on file     Attends meetings of clubs or organizations: Not on file     Relationship status: Not on file   Other Topics Concern    Not on file   Social History Narrative    Not on file       MEDICATIONS:   Current Outpatient Medications:     amLODIPine (NORVASC) 5 MG tablet, TK 1 T PO QD, Disp: , Rfl: 1    azelastine (ASTELIN) 137 mcg (0.1 %) nasal spray, 1 spray by Nasal route 2 (two) times daily., Disp: , Rfl:     cetirizine (ZYRTEC) 10 MG tablet, Take 10 mg by mouth once daily., Disp: , Rfl:     DYMISTA 137-50 mcg/spray [place] nassal spray, SHAKE WELL AND PALCE 1 SPRAY IEN BID, Disp: , Rfl: 1    FLUTICASONE PROPIONATE (FLONASE NASL), by Nasal route., Disp: , Rfl:     HYDROcodone-acetaminophen (NORCO) 5-325 mg per tablet, Take 1 tablet by mouth every 6 (six) hours as needed for Pain., Disp: 10 tablet, Rfl: 0    ibuprofen (ADVIL,MOTRIN) 800 MG tablet, Take 1 tablet (800 mg total) by mouth every 6 (six) hours as needed for Pain., Disp: 30 tablet, Rfl: 0    LAYOLIS FE 0.8mg-25mcg(24) and 75 mg (4) Chew, CSW 1 T PO QD, Disp: , Rfl: 1    meloxicam (MOBIC) 15 MG tablet, Take 15 mg by mouth once daily., Disp: , Rfl:     olmesartan-hydrochlorothiazide (BENICAR HCT) 20-12.5 mg per tablet, TK 1 T PO QD, Disp: , Rfl: 0    rizatriptan (MAXALT-MLT) 10 MG disintegrating tablet, Take 10 mg by mouth as needed for Migraine. May repeat in 2 hours if needed, Disp: , Rfl:     tiZANidine (ZANAFLEX) 4 MG tablet, TK 1 T PO  BID PRF SPASM/ PAIN RELIEF. DO NOT TAKE  WITH TRAMADOL, Disp: , Rfl: 2  ALLERGIES: Review of patient's allergies indicates:  No Known Allergies    Review of Systems   Constitution: Negative. Negative for chills, fever and night sweats.   HENT: Negative for congestion and headaches.    Eyes: Negative for blurred vision, left vision loss and right vision loss.   Cardiovascular: Negative for chest pain and syncope.   Respiratory: Negative for cough and shortness of breath.    Endocrine: Negative for polydipsia, polyphagia and polyuria.   Hematologic/Lymphatic: Negative for bleeding problem. Does not bruise/bleed easily.   Skin: Negative for dry skin, itching and rash.   Musculoskeletal: Negative for falls and muscle weakness.   Gastrointestinal: Negative for abdominal pain and bowel incontinence.   Genitourinary: Negative for bladder incontinence and nocturia.   Neurological: Negative for disturbances in coordination, loss of balance and seizures.   Psychiatric/Behavioral: Negative for depression. The patient does not have insomnia.    Allergic/Immunologic: Negative for hives and persistent infections.     PHYSICAL EXAM:  GEN: A&Ox3, WD WN NAD  HEENT: WNL  CHEST: CTAB, no W/R/R  HEART: RRR, no M/R/G   ABD: Soft, NT ND, BS x4 QUADS  MS: Refer to previous note for detailed MS exam  NEURO: CN II-XII intact       The surgical consent was then reviewed with the patient, who agreed with all the contents of the consent form and it was signed.     PHYSICAL THERAPY:  She was also instructed regarding physical therapy and will begin at [facility] when Dr. Milligan says to start.    POST OP CARE: Instructions were reviewed including care of the wound and dressing after surgery and when she can shower.     PAIN MANAGEMENT: Maria D Reid was instructed regarding the Polar ice unit that will be in place after surgery and her postoperative pain medications.     MEDICATION:  Roxicodone 5 mg 1-2 q 4 hours PRN for pain  Zofran 4 mg q 8 hours PRN for nausea and  vomiting.  Aspirin 81mg BID x 2 weeks for DVT prophylaxis starting on the evening after surgery.     She recently filled Hydrocodone 7.5/325mg 3 weeks ago prescribed by Dr. Reyna at pain management clinic in [place].    Patient was instructed to purchase and take Colace to counter possible GI side effects of taking opiates.     DVT prophylaxis was discussed with the patient today including risk factors for developing DVTs and history of DVTs. The patient was asked if any specific recommendations were given from the doctor/s that did pre-operative surgical clearance.      If the patient was previously taking 81mg baby aspirin, they were told to not take additional baby aspirin, using the above stated aspirin and to restart the 81mg aspirin daily after completion of the aspirin dose.      Patient was also told to buy over the counter Prilosec medication and take it once daily for GI protection as long as they are taking NSAIDs or Aspirin.     The patient was told that narcotic pain medications may make them drowsy and instructions were given to not sign legal documents, drive or operate heavy machinery, cars, or equipment while under the influence of narcotic medications.     Dr. Milligan was present in clinic during this pre-op evaluation. The patient was offered the opportunity to ask Dr. Milligan any further questions regarding the procedure which may not have been addressed during their previous informed consent discussion. The patient has declined to see Dr. Milligan today.    As there were no other questions to be asked, she was given my business card along with Dr. Milligan's business card if she has any questions or concerns prior to surgery or in the postop period.

## 2019-04-16 NOTE — H&P
Maria D Reid  is here for a completion of her perioperative paperwork. she  Is scheduled to undergo  RIGHT shoulder arthroscopic rotator cuff repair, SAD, and possible biceps tenodesis.     on 4/24/2019.  She is a healthy individual and does not need clearance for this procedure.      Risks, indications and benefits of the surgical procedure were discussed with the patient. All questions with regard to surgery, rehab, expected return to functional activities, activities of daily living and recreational endeavors were answered to her satisfaction.    Patient was informed and understands the risks of surgery are greater for patients with a current condition or hx of heart disease, obesity, clotting disorders, recurrent infections, steroid use, current or past smoking, and factors such as sedentary lifestyle and noncompliance with medications, therapy or f/u. The degree of the increased risk is hard to estimate w/ any degree of precision.    Once no other questions were asked, a brief history and physical exam was then performed.    PAST MEDICAL HISTORY:   Past Medical History:   Diagnosis Date    Allergy     Hypertension      PAST SURGICAL HISTORY:   Past Surgical History:   Procedure Laterality Date    GASTRIC BYPASS      KNEE SURGERY Right      FAMILY HISTORY:   Family History   Problem Relation Age of Onset    Hypertension Mother      SOCIAL HISTORY:   Social History     Socioeconomic History    Marital status: Single     Spouse name: Not on file    Number of children: Not on file    Years of education: Not on file    Highest education level: Not on file   Occupational History    Not on file   Social Needs    Financial resource strain: Not on file    Food insecurity:     Worry: Not on file     Inability: Not on file    Transportation needs:     Medical: Not on file     Non-medical: Not on file   Tobacco Use    Smoking status: Never Smoker    Smokeless tobacco: Never Used   Substance and Sexual  Activity    Alcohol use: Yes     Frequency: 2-4 times a month     Drinks per session: 1 or 2    Drug use: Never    Sexual activity: Not on file   Lifestyle    Physical activity:     Days per week: Not on file     Minutes per session: Not on file    Stress: Not on file   Relationships    Social connections:     Talks on phone: Not on file     Gets together: Not on file     Attends Worship service: Not on file     Active member of club or organization: Not on file     Attends meetings of clubs or organizations: Not on file     Relationship status: Not on file   Other Topics Concern    Not on file   Social History Narrative    Not on file       MEDICATIONS:   Current Outpatient Medications:     amLODIPine (NORVASC) 5 MG tablet, TK 1 T PO QD, Disp: , Rfl: 1    azelastine (ASTELIN) 137 mcg (0.1 %) nasal spray, 1 spray by Nasal route 2 (two) times daily., Disp: , Rfl:     cetirizine (ZYRTEC) 10 MG tablet, Take 10 mg by mouth once daily., Disp: , Rfl:     DYMISTA 137-50 mcg/spray [place] nassal spray, SHAKE WELL AND PALCE 1 SPRAY IEN BID, Disp: , Rfl: 1    FLUTICASONE PROPIONATE (FLONASE NASL), by Nasal route., Disp: , Rfl:     HYDROcodone-acetaminophen (NORCO) 5-325 mg per tablet, Take 1 tablet by mouth every 6 (six) hours as needed for Pain., Disp: 10 tablet, Rfl: 0    ibuprofen (ADVIL,MOTRIN) 800 MG tablet, Take 1 tablet (800 mg total) by mouth every 6 (six) hours as needed for Pain., Disp: 30 tablet, Rfl: 0    LAYOLIS FE 0.8mg-25mcg(24) and 75 mg (4) Chew, CSW 1 T PO QD, Disp: , Rfl: 1    meloxicam (MOBIC) 15 MG tablet, Take 15 mg by mouth once daily., Disp: , Rfl:     olmesartan-hydrochlorothiazide (BENICAR HCT) 20-12.5 mg per tablet, TK 1 T PO QD, Disp: , Rfl: 0    rizatriptan (MAXALT-MLT) 10 MG disintegrating tablet, Take 10 mg by mouth as needed for Migraine. May repeat in 2 hours if needed, Disp: , Rfl:     tiZANidine (ZANAFLEX) 4 MG tablet, TK 1 T PO  BID PRF SPASM/ PAIN RELIEF. DO NOT TAKE  WITH TRAMADOL, Disp: , Rfl: 2  ALLERGIES: Review of patient's allergies indicates:  No Known Allergies    Review of Systems   Constitution: Negative. Negative for chills, fever and night sweats.   HENT: Negative for congestion and headaches.    Eyes: Negative for blurred vision, left vision loss and right vision loss.   Cardiovascular: Negative for chest pain and syncope.   Respiratory: Negative for cough and shortness of breath.    Endocrine: Negative for polydipsia, polyphagia and polyuria.   Hematologic/Lymphatic: Negative for bleeding problem. Does not bruise/bleed easily.   Skin: Negative for dry skin, itching and rash.   Musculoskeletal: Negative for falls and muscle weakness.   Gastrointestinal: Negative for abdominal pain and bowel incontinence.   Genitourinary: Negative for bladder incontinence and nocturia.   Neurological: Negative for disturbances in coordination, loss of balance and seizures.   Psychiatric/Behavioral: Negative for depression. The patient does not have insomnia.    Allergic/Immunologic: Negative for hives and persistent infections.     PHYSICAL EXAM:  GEN: A&Ox3, WD WN NAD  HEENT: WNL  CHEST: CTAB, no W/R/R  HEART: RRR, no M/R/G   ABD: Soft, NT ND, BS x4 QUADS  MS: Refer to previous note for detailed MS exam  NEURO: CN II-XII intact       The surgical consent was then reviewed with the patient, who agreed with all the contents of the consent form and it was signed.     PHYSICAL THERAPY:  She was also instructed regarding physical therapy and will begin at [facility] when Dr. Milligan says to start.    POST OP CARE: Instructions were reviewed including care of the wound and dressing after surgery and when she can shower.     PAIN MANAGEMENT: Maria D Reid was instructed regarding the Polar ice unit that will be in place after surgery and her postoperative pain medications.     MEDICATION:  Roxicodone 5 mg 1-2 q 4 hours PRN for pain  Zofran 4 mg q 8 hours PRN for nausea and  vomiting.  Aspirin 81mg BID x 2 weeks for DVT prophylaxis starting on the evening after surgery.     She recently filled Hydrocodone 7.5/325mg 3 weeks ago prescribed by Dr. Reyna at pain management clinic in [place].    Patient was instructed to purchase and take Colace to counter possible GI side effects of taking opiates.     DVT prophylaxis was discussed with the patient today including risk factors for developing DVTs and history of DVTs. The patient was asked if any specific recommendations were given from the doctor/s that did pre-operative surgical clearance.      If the patient was previously taking 81mg baby aspirin, they were told to not take additional baby aspirin, using the above stated aspirin and to restart the 81mg aspirin daily after completion of the aspirin dose.      Patient was also told to buy over the counter Prilosec medication and take it once daily for GI protection as long as they are taking NSAIDs or Aspirin.     The patient was told that narcotic pain medications may make them drowsy and instructions were given to not sign legal documents, drive or operate heavy machinery, cars, or equipment while under the influence of narcotic medications.     Dr. Milligan was present in clinic during this pre-op evaluation. The patient was offered the opportunity to ask Dr. Milligan any further questions regarding the procedure which may not have been addressed during their previous informed consent discussion. The patient has declined to see Dr. Milligan today.    As there were no other questions to be asked, she was given my business card along with Dr. Milligan's business card if she has any questions or concerns prior to surgery or in the postop period.

## 2019-04-23 ENCOUNTER — ANESTHESIA EVENT (OUTPATIENT)
Dept: SURGERY | Facility: HOSPITAL | Age: 44
End: 2019-04-23
Payer: COMMERCIAL

## 2019-04-24 ENCOUNTER — ANESTHESIA (OUTPATIENT)
Dept: SURGERY | Facility: HOSPITAL | Age: 44
End: 2019-04-24
Payer: COMMERCIAL

## 2019-04-24 ENCOUNTER — HOSPITAL ENCOUNTER (OUTPATIENT)
Facility: HOSPITAL | Age: 44
Discharge: HOME | End: 2019-04-24
Attending: ORTHOPAEDIC SURGERY | Admitting: ORTHOPAEDIC SURGERY
Payer: COMMERCIAL

## 2019-04-24 VITALS
RESPIRATION RATE: 15 BRPM | DIASTOLIC BLOOD PRESSURE: 73 MMHG | HEART RATE: 98 BPM | OXYGEN SATURATION: 99 % | TEMPERATURE: 99 F | SYSTOLIC BLOOD PRESSURE: 133 MMHG

## 2019-04-24 DIAGNOSIS — M75.111 INCOMPLETE TEAR OF RIGHT ROTATOR CUFF: ICD-10-CM

## 2019-04-24 PROCEDURE — D9220A PRA ANESTHESIA: ICD-10-PCS | Mod: CRNA,,, | Performed by: NURSE ANESTHETIST, CERTIFIED REGISTERED

## 2019-04-24 PROCEDURE — 29827 PR SHLDR ARTHROSCOP,SURG,W/ROTAT CUFF REPR: ICD-10-PCS | Mod: RT,,, | Performed by: ORTHOPAEDIC SURGERY

## 2019-04-24 PROCEDURE — D9220A PRA ANESTHESIA: ICD-10-PCS | Mod: ANES,,, | Performed by: ANESTHESIOLOGY

## 2019-04-24 PROCEDURE — 27200651 HC AIRWAY, LMA: Performed by: NURSE ANESTHETIST, CERTIFIED REGISTERED

## 2019-04-24 PROCEDURE — 29826 PR SHLDR ARTHROSCOP,PART ACROMIOPLAS: ICD-10-PCS | Mod: RT,,, | Performed by: ORTHOPAEDIC SURGERY

## 2019-04-24 PROCEDURE — 36000710: Performed by: ORTHOPAEDIC SURGERY

## 2019-04-24 PROCEDURE — 29806 SHO ARTHRS SRG CAPSULORRAPHY: CPT | Mod: 59,51,RT, | Performed by: ORTHOPAEDIC SURGERY

## 2019-04-24 PROCEDURE — C1713 ANCHOR/SCREW BN/BN,TIS/BN: HCPCS | Performed by: ORTHOPAEDIC SURGERY

## 2019-04-24 PROCEDURE — 36000711: Performed by: ORTHOPAEDIC SURGERY

## 2019-04-24 PROCEDURE — D9220A PRA ANESTHESIA: Mod: CRNA,,, | Performed by: NURSE ANESTHETIST, CERTIFIED REGISTERED

## 2019-04-24 PROCEDURE — 29806 PR SHLDR ARTHROSCOP,SURG,CAPSULORRHAPHY: ICD-10-PCS | Mod: 59,51,RT, | Performed by: ORTHOPAEDIC SURGERY

## 2019-04-24 PROCEDURE — 71000016 HC POSTOP RECOV ADDL HR: Performed by: ORTHOPAEDIC SURGERY

## 2019-04-24 PROCEDURE — 63600175 PHARM REV CODE 636 W HCPCS: Performed by: ORTHOPAEDIC SURGERY

## 2019-04-24 PROCEDURE — 71000039 HC RECOVERY, EACH ADD'L HOUR: Performed by: ORTHOPAEDIC SURGERY

## 2019-04-24 PROCEDURE — 63600175 PHARM REV CODE 636 W HCPCS

## 2019-04-24 PROCEDURE — 29826 SHO ARTHRS SRG DECOMPRESSION: CPT | Mod: RT,,, | Performed by: ORTHOPAEDIC SURGERY

## 2019-04-24 PROCEDURE — 64416 INTERSCALENE BRACHIAL PLEXUS CATHETER: ICD-10-PCS | Mod: 59,RT,, | Performed by: ANESTHESIOLOGY

## 2019-04-24 PROCEDURE — 25000003 PHARM REV CODE 250: Performed by: NURSE ANESTHETIST, CERTIFIED REGISTERED

## 2019-04-24 PROCEDURE — 71000015 HC POSTOP RECOV 1ST HR: Performed by: ORTHOPAEDIC SURGERY

## 2019-04-24 PROCEDURE — 25000003 PHARM REV CODE 250: Performed by: ANESTHESIOLOGY

## 2019-04-24 PROCEDURE — 29827 SHO ARTHRS SRG RT8TR CUF RPR: CPT | Mod: RT,,, | Performed by: ORTHOPAEDIC SURGERY

## 2019-04-24 PROCEDURE — 64416 NJX AA&/STRD BRCH PL NFS IMG: CPT | Performed by: ANESTHESIOLOGY

## 2019-04-24 PROCEDURE — 27201423 OPTIME MED/SURG SUP & DEVICES STERILE SUPPLY: Performed by: ORTHOPAEDIC SURGERY

## 2019-04-24 PROCEDURE — 37000009 HC ANESTHESIA EA ADD 15 MINS: Performed by: ORTHOPAEDIC SURGERY

## 2019-04-24 PROCEDURE — 63600175 PHARM REV CODE 636 W HCPCS: Performed by: ANESTHESIOLOGY

## 2019-04-24 PROCEDURE — 25000003 PHARM REV CODE 250: Performed by: STUDENT IN AN ORGANIZED HEALTH CARE EDUCATION/TRAINING PROGRAM

## 2019-04-24 PROCEDURE — 37000008 HC ANESTHESIA 1ST 15 MINUTES: Performed by: ORTHOPAEDIC SURGERY

## 2019-04-24 PROCEDURE — 63600175 PHARM REV CODE 636 W HCPCS: Performed by: PHYSICIAN ASSISTANT

## 2019-04-24 PROCEDURE — 63600175 PHARM REV CODE 636 W HCPCS: Performed by: NURSE ANESTHETIST, CERTIFIED REGISTERED

## 2019-04-24 PROCEDURE — 63600175 PHARM REV CODE 636 W HCPCS: Performed by: STUDENT IN AN ORGANIZED HEALTH CARE EDUCATION/TRAINING PROGRAM

## 2019-04-24 PROCEDURE — 76942 ECHO GUIDE FOR BIOPSY: CPT | Mod: 26,,, | Performed by: ANESTHESIOLOGY

## 2019-04-24 PROCEDURE — D9220A PRA ANESTHESIA: Mod: ANES,,, | Performed by: ANESTHESIOLOGY

## 2019-04-24 PROCEDURE — 71000033 HC RECOVERY, INTIAL HOUR: Performed by: ORTHOPAEDIC SURGERY

## 2019-04-24 PROCEDURE — 76942 INTERSCALENE BRACHIAL PLEXUS CATHETER: ICD-10-PCS | Mod: 26,,, | Performed by: ANESTHESIOLOGY

## 2019-04-24 DEVICE — ANCHOR SWIVELOCK C TIGERTAPE: Type: IMPLANTABLE DEVICE | Site: SHOULDER | Status: FUNCTIONAL

## 2019-04-24 DEVICE — ANCHOR SUT HIP 2.9X15.5MM: Type: IMPLANTABLE DEVICE | Site: SHOULDER | Status: FUNCTIONAL

## 2019-04-24 DEVICE — ANCHOR BIOCOMPOSITE 2.9X15.5MM: Type: IMPLANTABLE DEVICE | Site: SHOULDER | Status: FUNCTIONAL

## 2019-04-24 DEVICE — ANCHOR DBL LOAD 4.75MM SWVLK: Type: IMPLANTABLE DEVICE | Site: SHOULDER | Status: FUNCTIONAL

## 2019-04-24 RX ORDER — LIDOCAINE HCL/PF 100 MG/5ML
SYRINGE (ML) INTRAVENOUS
Status: DISCONTINUED | OUTPATIENT
Start: 2019-04-24 | End: 2019-04-24

## 2019-04-24 RX ORDER — DEXAMETHASONE SODIUM PHOSPHATE 4 MG/ML
INJECTION, SOLUTION INTRA-ARTICULAR; INTRALESIONAL; INTRAMUSCULAR; INTRAVENOUS; SOFT TISSUE
Status: DISCONTINUED | OUTPATIENT
Start: 2019-04-24 | End: 2019-04-24

## 2019-04-24 RX ORDER — CELECOXIB 200 MG/1
400 CAPSULE ORAL ONCE
Status: COMPLETED | OUTPATIENT
Start: 2019-04-24 | End: 2019-04-24

## 2019-04-24 RX ORDER — MIDAZOLAM HYDROCHLORIDE 1 MG/ML
0.5 INJECTION INTRAMUSCULAR; INTRAVENOUS
Status: DISCONTINUED | OUTPATIENT
Start: 2019-04-24 | End: 2019-04-24 | Stop reason: HOSPADM

## 2019-04-24 RX ORDER — ACETAMINOPHEN 500 MG
1000 TABLET ORAL ONCE
Status: COMPLETED | OUTPATIENT
Start: 2019-04-24 | End: 2019-04-24

## 2019-04-24 RX ORDER — FENTANYL CITRATE 50 UG/ML
25 INJECTION, SOLUTION INTRAMUSCULAR; INTRAVENOUS EVERY 5 MIN PRN
Status: COMPLETED | OUTPATIENT
Start: 2019-04-24 | End: 2019-04-24

## 2019-04-24 RX ORDER — PROPOFOL 10 MG/ML
VIAL (ML) INTRAVENOUS
Status: DISCONTINUED | OUTPATIENT
Start: 2019-04-24 | End: 2019-04-24

## 2019-04-24 RX ORDER — FENTANYL CITRATE 50 UG/ML
25 INJECTION, SOLUTION INTRAMUSCULAR; INTRAVENOUS EVERY 5 MIN PRN
Status: DISCONTINUED | OUTPATIENT
Start: 2019-04-24 | End: 2019-04-24 | Stop reason: HOSPADM

## 2019-04-24 RX ORDER — OXYCODONE HYDROCHLORIDE 5 MG/1
10 TABLET ORAL ONCE
Status: COMPLETED | OUTPATIENT
Start: 2019-04-24 | End: 2019-04-24

## 2019-04-24 RX ORDER — ONDANSETRON 2 MG/ML
INJECTION INTRAMUSCULAR; INTRAVENOUS
Status: DISCONTINUED | OUTPATIENT
Start: 2019-04-24 | End: 2019-04-24

## 2019-04-24 RX ORDER — HYDROMORPHONE HYDROCHLORIDE 1 MG/ML
0.5 INJECTION, SOLUTION INTRAMUSCULAR; INTRAVENOUS; SUBCUTANEOUS
Status: DISCONTINUED | OUTPATIENT
Start: 2019-04-24 | End: 2019-04-24 | Stop reason: HOSPADM

## 2019-04-24 RX ORDER — KETOROLAC TROMETHAMINE 30 MG/ML
INJECTION, SOLUTION INTRAMUSCULAR; INTRAVENOUS
Status: DISCONTINUED
Start: 2019-04-24 | End: 2019-04-24 | Stop reason: HOSPADM

## 2019-04-24 RX ORDER — SODIUM CHLORIDE 9 MG/ML
INJECTION, SOLUTION INTRAVENOUS CONTINUOUS PRN
Status: DISCONTINUED | OUTPATIENT
Start: 2019-04-24 | End: 2019-04-24

## 2019-04-24 RX ORDER — BUPIVACAINE HYDROCHLORIDE AND EPINEPHRINE 2.5; 5 MG/ML; UG/ML
INJECTION, SOLUTION EPIDURAL; INFILTRATION; INTRACAUDAL; PERINEURAL
Status: COMPLETED | OUTPATIENT
Start: 2019-04-24 | End: 2019-04-24

## 2019-04-24 RX ORDER — CEFAZOLIN SODIUM 1 G/3ML
2 INJECTION, POWDER, FOR SOLUTION INTRAMUSCULAR; INTRAVENOUS
Status: DISCONTINUED | OUTPATIENT
Start: 2019-04-24 | End: 2019-04-24 | Stop reason: HOSPADM

## 2019-04-24 RX ORDER — OXYCODONE HYDROCHLORIDE 5 MG/1
5 TABLET ORAL
Status: DISCONTINUED | OUTPATIENT
Start: 2019-04-24 | End: 2019-04-24 | Stop reason: HOSPADM

## 2019-04-24 RX ORDER — EPINEPHRINE 1 MG/ML
INJECTION, SOLUTION INTRACARDIAC; INTRAMUSCULAR; INTRAVENOUS; SUBCUTANEOUS
Status: DISCONTINUED | OUTPATIENT
Start: 2019-04-24 | End: 2019-04-24 | Stop reason: HOSPADM

## 2019-04-24 RX ORDER — FENTANYL CITRATE 50 UG/ML
INJECTION, SOLUTION INTRAMUSCULAR; INTRAVENOUS
Status: COMPLETED
Start: 2019-04-24 | End: 2019-04-24

## 2019-04-24 RX ORDER — ONDANSETRON 2 MG/ML
4 INJECTION INTRAMUSCULAR; INTRAVENOUS ONCE
Status: COMPLETED | OUTPATIENT
Start: 2019-04-24 | End: 2019-04-24

## 2019-04-24 RX ORDER — FENTANYL CITRATE 50 UG/ML
INJECTION, SOLUTION INTRAMUSCULAR; INTRAVENOUS
Status: DISCONTINUED | OUTPATIENT
Start: 2019-04-24 | End: 2019-04-24

## 2019-04-24 RX ORDER — PREGABALIN 150 MG/1
150 CAPSULE ORAL ONCE
Status: COMPLETED | OUTPATIENT
Start: 2019-04-24 | End: 2019-04-24

## 2019-04-24 RX ORDER — KETOROLAC TROMETHAMINE 30 MG/ML
30 INJECTION, SOLUTION INTRAMUSCULAR; INTRAVENOUS ONCE
Status: COMPLETED | OUTPATIENT
Start: 2019-04-24 | End: 2019-04-24

## 2019-04-24 RX ORDER — OXYCODONE HYDROCHLORIDE 5 MG/1
TABLET ORAL
Status: DISCONTINUED
Start: 2019-04-24 | End: 2019-04-24 | Stop reason: HOSPADM

## 2019-04-24 RX ORDER — MIDAZOLAM HYDROCHLORIDE 1 MG/ML
INJECTION, SOLUTION INTRAMUSCULAR; INTRAVENOUS
Status: DISCONTINUED | OUTPATIENT
Start: 2019-04-24 | End: 2019-04-24

## 2019-04-24 RX ADMIN — FENTANYL CITRATE 25 MCG: 50 INJECTION INTRAMUSCULAR; INTRAVENOUS at 03:04

## 2019-04-24 RX ADMIN — CEFAZOLIN 2 G: 330 INJECTION, POWDER, FOR SOLUTION INTRAMUSCULAR; INTRAVENOUS at 12:04

## 2019-04-24 RX ADMIN — PROPOFOL 200 MG: 10 INJECTION, EMULSION INTRAVENOUS at 11:04

## 2019-04-24 RX ADMIN — ONDANSETRON 4 MG: 2 INJECTION INTRAMUSCULAR; INTRAVENOUS at 05:04

## 2019-04-24 RX ADMIN — OXYCODONE HYDROCHLORIDE 10 MG: 5 TABLET ORAL at 03:04

## 2019-04-24 RX ADMIN — FENTANYL CITRATE 25 MCG: 50 INJECTION INTRAMUSCULAR; INTRAVENOUS at 04:04

## 2019-04-24 RX ADMIN — OXYCODONE HYDROCHLORIDE 5 MG: 5 TABLET ORAL at 03:04

## 2019-04-24 RX ADMIN — FENTANYL CITRATE 50 MCG: 50 INJECTION, SOLUTION INTRAMUSCULAR; INTRAVENOUS at 02:04

## 2019-04-24 RX ADMIN — DEXAMETHASONE SODIUM PHOSPHATE 4 MG: 4 INJECTION, SOLUTION INTRAMUSCULAR; INTRAVENOUS at 12:04

## 2019-04-24 RX ADMIN — FENTANYL CITRATE 50 MCG: 50 INJECTION, SOLUTION INTRAMUSCULAR; INTRAVENOUS at 11:04

## 2019-04-24 RX ADMIN — CELECOXIB 400 MG: 200 CAPSULE ORAL at 09:04

## 2019-04-24 RX ADMIN — KETOROLAC TROMETHAMINE 30 MG: 30 INJECTION, SOLUTION INTRAMUSCULAR at 04:04

## 2019-04-24 RX ADMIN — ONDANSETRON 4 MG: 2 INJECTION INTRAMUSCULAR; INTRAVENOUS at 12:04

## 2019-04-24 RX ADMIN — SODIUM CHLORIDE: 0.9 INJECTION, SOLUTION INTRAVENOUS at 11:04

## 2019-04-24 RX ADMIN — FENTANYL CITRATE 50 MCG: 50 INJECTION INTRAMUSCULAR; INTRAVENOUS at 09:04

## 2019-04-24 RX ADMIN — FENTANYL CITRATE 50 MCG: 50 INJECTION INTRAMUSCULAR; INTRAVENOUS at 10:04

## 2019-04-24 RX ADMIN — LIDOCAINE HYDROCHLORIDE 75 MG: 20 INJECTION, SOLUTION INTRAVENOUS at 11:04

## 2019-04-24 RX ADMIN — MIDAZOLAM HYDROCHLORIDE 2 MG: 1 INJECTION, SOLUTION INTRAMUSCULAR; INTRAVENOUS at 09:04

## 2019-04-24 RX ADMIN — MIDAZOLAM HYDROCHLORIDE 1 MG: 1 INJECTION, SOLUTION INTRAMUSCULAR; INTRAVENOUS at 09:04

## 2019-04-24 RX ADMIN — ACETAMINOPHEN 1000 MG: 500 TABLET ORAL at 09:04

## 2019-04-24 RX ADMIN — BUPIVACAINE HYDROCHLORIDE AND EPINEPHRINE BITARTRATE 30 ML: 2.5; .0091 INJECTION, SOLUTION EPIDURAL; INFILTRATION; INTRACAUDAL; PERINEURAL at 10:04

## 2019-04-24 RX ADMIN — SODIUM CHLORIDE, SODIUM GLUCONATE, SODIUM ACETATE, POTASSIUM CHLORIDE, MAGNESIUM CHLORIDE, SODIUM PHOSPHATE, DIBASIC, AND POTASSIUM PHOSPHATE: .53; .5; .37; .037; .03; .012; .00082 INJECTION, SOLUTION INTRAVENOUS at 12:04

## 2019-04-24 RX ADMIN — PREGABALIN 150 MG: 150 CAPSULE ORAL at 09:04

## 2019-04-24 RX ADMIN — MIDAZOLAM HYDROCHLORIDE 2 MG: 1 INJECTION, SOLUTION INTRAMUSCULAR; INTRAVENOUS at 11:04

## 2019-04-24 RX ADMIN — ROPIVACAINE HYDROCHLORIDE: 2 INJECTION, SOLUTION EPIDURAL; INFILTRATION at 02:04

## 2019-04-24 NOTE — BRIEF OP NOTE
[facility]  Brief Operative Note     SUMMARY     Surgery Date: 4/24/2019     Surgeon(s) and Role:     * JEREMÍAS Milligan MD - Primary     * Ryan Beltran MD - Fellow    Assisting Surgeon: None    Pre-op Diagnosis:  Strain of right rotator cuff capsule, initial encounter [S46.011A]    Post-op Diagnosis:  Post-Op Diagnosis Codes:     * Strain of right rotator cuff capsule, initial encounter [S46.011A]    Procedure(s) (LRB):  ARTHROSCOPY, SHOULDER,ROTATOR CUFF REPAIR (Right)  ARTHROSCOPY, SHOULDER, WITH BANKART REPAIR (Right)  CAPSULORRHAPHY (Right)  DECOMPRESSION, SUBACROMIAL SPACE (Right)    Anesthesia: General    Description of the findings of the procedure: soft tissue bankart labral tear identified - labral repair completed with 2 anchors anteriorly and 2 anchors posteriorly. LHBT intact. High grade partial thickness bursal sided supraspinatus tear identified - completed and repaired with single medial row anchor and 2 lateral row anchors. SAD performed.    Findings/Key Components: see above    Estimated Blood Loss: 10mL         Specimens:   Specimen (12h ago, onward)    None          Discharge Note    SUMMARY     Admit Date: 4/24/2019    Discharge Date and Time:  04/24/2019 2:50 PM    Hospital Course (synopsis of major diagnoses, care, treatment, and services provided during the course of the hospital stay): to PACU postop. D/C from PACU once criteria met     Final Diagnosis: Post-Op Diagnosis Codes:     * Strain of right rotator cuff capsule, initial encounter [S46.011A]    Disposition: Home or Self Care    Follow Up/Patient Instructions:     Medications:  Reconciled Home Medications:      Medication List      CONTINUE taking these medications    amLODIPine 5 MG tablet  Commonly known as:  NORVASC  TK 1 T PO QD     aspirin 81 MG EC tablet  Commonly known as:  ECOTRIN  Take 1 tablet (81 mg total) by mouth 2 (two) times daily. for 14 days     azelastine 137 mcg (0.1 %) nasal  spray  Commonly known as:  ASTELIN  1 spray by Nasal route 2 (two) times daily as needed.     cetirizine 10 MG tablet  Commonly known as:  ZYRTEC  Take 10 mg by mouth once daily.     DYMISTA 137-50 mcg/spray [place] nassal spray  Generic drug:  azelastine-fluticasone  SHAKE WELL AND PALCE 1 SPRAY IEN BID  prn     FLONASE NASL  by Nasal route daily as needed.     HYDROcodone-acetaminophen 5-325 mg per tablet  Commonly known as:  NORCO  Take 1 tablet by mouth every 6 (six) hours as needed for Pain.     ibuprofen 800 MG tablet  Commonly known as:  ADVIL,MOTRIN  Take 1 tablet (800 mg total) by mouth every 6 (six) hours as needed for Pain.     LAYOLIS FE 0.8mg-25mcg(24) and 75 mg (4) Chew  Generic drug:  noreth-ethinyl estradiol-iron  CSW 1 T PO QD     meloxicam 15 MG tablet  Commonly known as:  MOBIC  Take 15 mg by mouth once daily.     olmesartan-hydrochlorothiazide 20-12.5 mg per tablet  Commonly known as:  BENICAR HCT  TK 1 T PO QD     oxyCODONE 5 MG immediate release tablet  Commonly known as:  ROXICODONE  Take 1-2 tablets (5-10 mg total) by mouth every 6 (six) hours as needed for Pain.     rizatriptan 10 MG disintegrating tablet  Commonly known as:  MAXALT-MLT  Take 10 mg by mouth as needed for Migraine. May repeat in 2 hours if needed     tiZANidine 4 MG tablet  Commonly known as:  ZANAFLEX  TK 1 T PO  BID PRF SPASM/ PAIN RELIEF. DO NOT TAKE WITH TRAMADOL        ASK your doctor about these medications    ondansetron 4 MG tablet  Commonly known as:  ZOFRAN  Take 1 tablet (4 mg total) by mouth every 8 (eight) hours as needed.  Ask about: Should I take this medication?          Discharge Procedure Orders   Notify your health care provider if you experience any of the following:  temperature >100.4     Notify your health care provider if you experience any of the following:  persistent nausea and vomiting or diarrhea     Notify your health care provider if you experience any of the following:  severe uncontrolled pain      Notify your health care provider if you experience any of the following:  redness, tenderness, or signs of infection (pain, swelling, redness, odor or green/yellow discharge around incision site)     Notify your health care provider if you experience any of the following:  difficulty breathing or increased cough     Notify your health care provider if you experience any of the following:  severe persistent headache     Notify your health care provider if you experience any of the following:  worsening rash     Notify your health care provider if you experience any of the following:  persistent dizziness, light-headedness, or visual disturbances     Notify your health care provider if you experience any of the following:  increased confusion or weakness     Notify your health care provider if you experience any of the following:

## 2019-04-24 NOTE — PROGRESS NOTES
MD at bedside to assess pain, pt asleep at that time, stimulated to wake up, block catheter dosed per MD, will monitor.

## 2019-04-24 NOTE — PLAN OF CARE
Patient states they are ready to be discharged. Instructions and prescription given to patient and family. Both verbalize understanding. Patient tolerating po liquids with no difficulty. Patient states pain is at a tolerable level for them. Anesthesia consent and surgical consent in chart upon patient's discharge from [facility].

## 2019-04-24 NOTE — ANESTHESIA PREPROCEDURE EVALUATION
04/24/2019  Maria D Reid is a 43 y.o., female   Pre-operative evaluation for Procedure(s) (LRB):  ARTHROSCOPY, SHOULDER,ROTATOR CUFF REPAIR (Right)    Maria D Reid is a 43 y.o. female       Active problems:    Prev airway:       Review of patient's allergies indicates:  No Known Allergies     No current facility-administered medications on file prior to encounter.      Current Outpatient Medications on File Prior to Encounter   Medication Sig Dispense Refill    amLODIPine (NORVASC) 5 MG tablet TK 1 T PO QD  1    azelastine (ASTELIN) 137 mcg (0.1 %) nasal spray 1 spray by Nasal route 2 (two) times daily as needed.       cetirizine (ZYRTEC) 10 MG tablet Take 10 mg by mouth once daily.      DYMISTA 137-50 mcg/spray [place] nassal spray SHAKE WELL AND PALCE 1 SPRAY IEN BID  prn  1    FLUTICASONE PROPIONATE (FLONASE NASL) by Nasal route daily as needed.       HYDROcodone-acetaminophen (NORCO) 5-325 mg per tablet Take 1 tablet by mouth every 6 (six) hours as needed for Pain. 10 tablet 0    ibuprofen (ADVIL,MOTRIN) 800 MG tablet Take 1 tablet (800 mg total) by mouth every 6 (six) hours as needed for Pain. 30 tablet 0    LAYOLIS FE 0.8mg-25mcg(24) and 75 mg (4) Chew CSW 1 T PO QD  1    meloxicam (MOBIC) 15 MG tablet Take 15 mg by mouth once daily.      olmesartan-hydrochlorothiazide (BENICAR HCT) 20-12.5 mg per tablet TK 1 T PO QD  0    tiZANidine (ZANAFLEX) 4 MG tablet TK 1 T PO  BID PRF SPASM/ PAIN RELIEF. DO NOT TAKE WITH TRAMADOL  2    rizatriptan (MAXALT-MLT) 10 MG disintegrating tablet Take 10 mg by mouth as needed for Migraine. May repeat in 2 hours if needed         Past Surgical History:   Procedure Laterality Date    GASTRIC BYPASS      KNEE SURGERY Right        Social History     Socioeconomic History    Marital status: Single     Spouse name: Not on file    Number of children: Not on file     Years of education: Not on file    Highest education level: Not on file   Occupational History    Not on file   Social Needs    Financial resource strain: Not on file    Food insecurity:     Worry: Not on file     Inability: Not on file    Transportation needs:     Medical: Not on file     Non-medical: Not on file   Tobacco Use    Smoking status: Never Smoker    Smokeless tobacco: Never Used   Substance and Sexual Activity    Alcohol use: Yes     Frequency: 2-4 times a month     Drinks per session: 1 or 2    Drug use: Never    Sexual activity: Not on file   Lifestyle    Physical activity:     Days per week: Not on file     Minutes per session: Not on file    Stress: Not on file   Relationships    Social connections:     Talks on phone: Not on file     Gets together: Not on file     Attends Cheondoism service: Not on file     Active member of club or organization: Not on file     Attends meetings of clubs or organizations: Not on file     Relationship status: Not on file   Other Topics Concern    Not on file   Social History Narrative    Not on file         Vital Signs Range (Last 24H):  Wt Readings from Last 3 Encounters:   19 101.2 kg (223 lb)   19 101.2 kg (223 lb)   19 101.4 kg (223 lb 7 oz)     Temp Readings from Last 3 Encounters:   19 37.1 °C (98.7 °F) (Oral)   19 36.8 °C (98.2 °F) (Tympanic)     BP Readings from Last 3 Encounters:   19 133/79   19 (!) 133/99   19 (!) 146/85     Pulse Readings from Last 3 Encounters:   19 83   19 69   19 82         CBC: No results found for: WBC, HGB, HCT, MCV, PLT    CMP: CMP  No results found for: NA, K, CL, CO2, GLU, BUN, CREATININE, CALCIUM, PROT, ALBUMIN, BILITOT, ALKPHOS, AST, ALT, ANIONGAP, ESTGFRAFRICA, EGFRNONAA    INR  No results found for: INR, PROTIME        Diagnostic Studies:      EKD Echo:        .    Anesthesia Evaluation         Review of Systems  Anesthesia Hx:  No  problems with previous Anesthesia History of prior surgery of interest to airway management or planning: Denies Family Hx of Anesthesia complications.   Denies Personal Hx of Anesthesia complications.   EENT/Dental:EENT/Dental Normal   Cardiovascular:   Hypertension Denies MI.  Denies CAD.    Denies CABG/stent.  no hyperlipidemia        Pulmonary:  Pulmonary Normal    Neurological:  Neurology Normal    Endocrine:  Endocrine Normal        Physical Exam  General:  Well nourished    Airway/Jaw/Neck:  Airway Findings: Mouth Opening: Normal Tongue: Normal  General Airway Assessment: Adult  Mallampati: I  Jaw/Neck Findings:  Neck ROM: Normal ROM      Dental:  Dental Findings: In tact   Chest/Lungs:  Chest/Lungs Findings: Clear to auscultation     Heart/Vascular:  Heart Findings: Rate: Normal  Rhythm: Regular Rhythm  Sounds: Normal        Mental Status:  Mental Status Findings:  Cooperative         Anesthesia Plan  Type of Anesthesia, risks & benefits discussed:  Anesthesia Type:  general  Patient's Preference:   Intra-op Monitoring Plan:   Intra-op Monitoring Plan Comments:   Post Op Pain Control Plan:   Post Op Pain Control Plan Comments:   Induction:   IV  Beta Blocker:  Patient is not currently on a Beta-Blocker (No further documentation required).       Informed Consent: Patient understands risks and agrees with Anesthesia plan.  Questions answered. Anesthesia consent signed with patient.  ASA Score: 2     Day of Surgery Review of History & Physical:    H&P update referred to the surgeon.         Ready For Surgery From Anesthesia Perspective.

## 2019-04-24 NOTE — TRANSFER OF CARE
Anesthesia Transfer of Care Note    Patient: Maria D Reid    Procedure(s) Performed: Procedure(s) (LRB):  ARTHROSCOPY, SHOULDER,ROTATOR CUFF REPAIR (Right)  ARTHROSCOPY, SHOULDER, WITH BANKART REPAIR (Right)  CAPSULORRHAPHY (Right)  DECOMPRESSION, SUBACROMIAL SPACE (Right)    Patient location: PACU    Anesthesia Type: regional    Transport from OR: Transported from OR on 6-10 L/min O2 by face mask with adequate spontaneous ventilation    Post pain: adequate analgesia    Post assessment: tolerated procedure well and no apparent anesthetic complications    Post vital signs: stable    Level of consciousness: sedated    Nausea/Vomiting: no nausea/vomiting    Complications: none    Transfer of care protocol was followed      Last vitals:   Visit Vitals  /64 (BP Location: Left arm, Patient Position: Lying)   Pulse 67   Resp 17   SpO2 99%   Breastfeeding? No

## 2019-04-24 NOTE — ANESTHESIA PROCEDURE NOTES
Interscalene Brachial Plexus Catheter    Patient location during procedure: pre-op   Block not for primary anesthetic.  Reason for block: at surgeon's request and post-op pain management   Post-op Pain Location: R shoulder pain  Start time: 4/24/2019 10:05 AM  Timeout: 4/24/2019 10:05 AM   End time: 4/24/2019 10:10 AM  Staffing  Anesthesiologist: Jaz Smith MD  Other anesthesia staff: Jefferson Jaeger MD  Performed: other anesthesia staff   Preanesthetic Checklist  Completed: patient identified, site marked, surgical consent, pre-op evaluation, timeout performed, IV checked, risks and benefits discussed and monitors and equipment checked  Peripheral Block  Patient position: sitting  Prep: ChloraPrep and site prepped and draped  Patient monitoring: heart rate, cardiac monitor, continuous pulse ox, continuous capnometry and frequent blood pressure checks  Block type: interscalene  Laterality: right  Injection technique: continuous  Needle  Needle type: Tuohy   Needle gauge: 18 G  Needle length: 2 in  Needle localization: anatomical landmarks and ultrasound guidance  Catheter type: non-stimulating  Catheter size: 20 G  Test dose: lidocaine 1.5% with Epi 1-to-200,000 and negative   -ultrasound image captured on disc.  Assessment  Injection assessment: negative aspiration, negative parasthesia and local visualized surrounding nerve  Paresthesia pain: none  Heart rate change: no  Slow fractionated injection: yes  Additional Notes  VSS.  [facility] RN monitoring vitals throughout procedure.  Patient tolerated procedure well.

## 2019-04-24 NOTE — DISCHARGE INSTRUCTIONS
Discharge Instructions for Shoulder Arthroscopy  You had a shoulder arthroscopy. It is a surgical procedure that helps the healthcare provider diagnose and treat shoulder problems. These include instability, arthritis, and rotator cuff problems. Below are instructions to help you care for your shoulder when you are at home.  What to expect  After surgery, your joint may be swollen, painful, and stiff. The joint will heal with time. But, recovery times vary depending on what was done. For example, with a shaved rotator cuff, you may be told to move your arm soon after surgery to prevent stiffness. But if the rotator cuff is repaired or treatment is for instability or arthritis, your healthcare provider may want you to limit movement of your arm for a period of time. Follow your healthcare providers instructions regarding arm movement.  Activity     You may be told to do daily pendulum swings to improve your joints flexibility. Use your torso to move your arm in a [place], first in one direction, then the other.   · Dont drive until your healthcare provider says its OK. And never drive while taking opioid pain medicine.  · Ask your healthcare provider when it is safe to do Pendulum exercises:    ¨ Hold on to the back of a chair, or lean on a tabletop with your healthy arm.  ¨ Do not actively move your arm with your shoulder muscles during this process. Instead, allow your arm to sway gently.    ¨ Use your torso to move your affected arm in a [place]. First do 20 circles in one direction. Then do 20 circles in the other direction.  ¨ Repeat the pendulum exercise every 2 hour(s). When you feel ready, increase the number of circles to 50 in each direction, every 2 hour(s).  · Bend your wrist and wiggle your fingers often to help blood flow.  Incision care  · Check your incision daily for redness, tenderness, or drainage.  · Wait 3 day(s) after your surgery to begin showering. Then shower as needed. Carefully wash  your incision with soap and water. Gently pat it dry. Dont rub the incision, or apply creams or lotions to it.  · Dont soak in a bathtub, hot tub, or pool until your healthcare provider says its OK.  Other home care  · Take your temperature daily for 7 days after your surgery. Report a fever above 100.4º F (38º C) to your healthcare provider. Fever may be a sign of infection.  · Wear your sling or immobilizer as directed by your healthcare provider.  · Use pain medicine, as needed and as directed. Don't wait until the pain is severe to start using the medicine.   · Use an ice pack or a bag of frozen peas--or something similar--wrapped in a thin towel on your shoulder to reduce swelling for the first 48 hours after surgery. Hold the ice pack. Leave the ice pack on for 20 minutes; then take it off for 20 minutes. Repeat as needed.  Follow-up  Make a follow-up appointment as directed by your healthcare provider.  When to seek medical attention  Call 911 right away if you have any of the following:  · Chest pain  · Shortness of breath  Otherwise, call your healthcare provider immediately if you have any of the following:  · Increasing shoulder pain or pain not relieved by medicine  · Pain or swelling in the arm on the side of your surgery  · Numbness, tingling, or blue-gray color of your arm or fingers on the side of your surgery  · Drainage or oozing, redness, or warmth at the incision  · Fever above 100.4º F (38º C) or shaking chills  · Nausea or vomiting   Date Last Reviewed: 11/16/2015 © 2000-2017 [employer]. [address] [place] [address], [place] [address]. All rights reserved. This information is not intended as a substitute for professional medical care. Always follow your healthcare professional's instructions.    PATIENT INSTRUCTIONS  POST-ANESTHESIA    IMMEDIATELY FOLLOWING SURGERY:  Do not drive or operate machinery for the first twenty four hours after surgery.  Do not make any important  decisions for twenty four hours after surgery or while taking narcotic pain medications or sedatives.  If you develop intractable nausea and vomiting or a severe headache please notify your doctor immediately.    FOLLOW-UP:  Please make an appointment with your surgeon as instructed. You do not need to follow up with anesthesia unless specifically instructed to do so.    WOUND CARE INSTRUCTIONS (if applicable):  Keep a dry clean dressing on the anesthesia/puncture wound site if there is drainage.  Once the wound has quit draining you may leave it open to air.  Generally you should leave the bandage intact for twenty four hours unless there is drainage.  If the epidural site drains for more than 36-48 hours please call the anesthesia department.    QUESTIONS?:  Please feel free to call your physician or the hospital  if you have any questions, and they will be happy to assist you.       [facility] Anesthesia Department  [address] [facility]  [phone]

## 2019-04-24 NOTE — PROGRESS NOTES
Pt asleep, easily arrousable between care, states 10/10 pain when aroused from sleep. Will continue to monitor.

## 2019-04-24 NOTE — OP NOTE
[facility]   OPERATIVE REPORT   ORTHOPAEDIC SURGERY   PROVIDER: DR. SNOW KEVIN    PATIENT INFORMATION   Maria D Reid 43 y.o. female 1975   MRN: [id number]   LOCATION: [facility]     DATE OF PROCEDURE: 4/24/2019     PREOPERATIVE DIAGNOSES:   Right  1. Shoulder rotator cuff tear   2. Shoulder biceps tendinitis  3. Shoulder acute grade III AC joint separation    POSTOPERATIVE DIAGNOSES:   Right  1. Shoulder rotator cuff tear, high grade partial bursal sided   2. Shoulder external impingement  3. Shoulder Bankart tear with posterior inferior extension  4. Shoulder acute grade III AC joint separation  5. Shoulder minimal biceps tenosynovitis     OPERATION:   Right  1. Shoulder arthroscopic Bankart repair (CPT 83462)  2. Shoulder arthroscopic posterior inferior labral repair (CPT 90732)  3. Shoulder arthroscopic rotator cuff repair (CPT 36614)  4. Shoulder arthroscopic extensive debridement (anterior, posterior glenohumeral joint, subacromial space) (CPT 29480)    5. Shoulder arthroscopic subacromial bursectomy with decompression (CPT 36386)    6. Shoulder non-operative treatment of AC joint separation    Surgeon(s) and Role:     * JEREMÍAS Kevin MD - Primary     * Ryan Beltran MD - Fellow     * Ana Jeffers [facility]    ANESTHESIA: General with interscalene block    ESTIMATED BLOOD LOSS: 20 cc    IMPLANTS:   Implant Name Type Inv. Item Serial No.  Lot No. LRB No. Used   ANCHOR BIOCOMPOSITE 2.9X15.5MM - [id number]  ANCHOR BIOCOMPOSITE 2.9X15.5MM  [employer] [id number] Right 2   ANCHOR BIOCOMPOSITE 2.9X15.5MM - [id number]  ANCHOR BIOCOMPOSITE 2.9X15.5MM  [employer] [id number] Right 1   SUTURE ANCHOR HIP 2.9X15.5MM - [id number]  SUTURE ANCHOR HIP 2.9X15.5MM  [employer] [id number] Right 1   SUTURE ANCHOR, BIOCOMPOSITE SWIVEL LOCK      [id number] Right 1   ANCHOR DBL LOAD 4.75MM SWVLK - [id number]  ANCHOR DBL LOAD 4.75MM SWVLK  [employer] [id number] Right 2        SPECIMENS:   Specimen (12h ago,  onward)    None        COMPLICATIONS: None.     INTRAOPERATIVE COUNTS: Correct.     PROPHYLACTIC IV ANTIBIOTICS: Given per [facility] Protocol.    INDICATIONS FOR PROCEDURE:   Maria D Reid 43 y.o. female  has been seen and evaluated in the office for shoulder after a fall requiring a trip to urgent care.  The patient was diagnosed with an AC separation.  Imaging has demonstrated this as well as what was felt to be a small near full-thickness to full-thickness rotator cuff tear. The patient was indicated for surgical repair of the rotator cuff.  We discussed treatment for the AC separation and have elected for non operative treatment of this.  The details of surgery were discussed to include the expected postop rehab and recovery course.    DETAILS OF PROCEDURE:  After the correct operative site was marked by the operating surgeon, an interscalene block was administered by the anesthesia team.  The patient was then taken to the operating room and placed supine on the operating room table, where the patient underwent general anesthesia by the anesthesia team.  The patient was then rolled into the lateral decubitus position with the operative side up.  A well-padded axillary roll, beanbag and pillows were placed.  All pressure points were carefully padded and checked.  The upper extremities and both lower extremities were placed in comfortable positions and were also well-padded.      A verbal timeout was confirmed to identify the patient, operative site and planned operative procedure. It was also confirmed the patient had received preoperative IV antibiotic per protocol.     Examination under anesthesia demonstrated full passive overhead range of motion. Stable load and shift compared to the contralateral side.  Negative sulcus.    The operative upper extremity was then prepped and draped in the usual sterile fashion.     The Spider arm positioner was implemented with balanced suspension and appropriate landmarks were  noted on the skin.  A posterior followed by cresencio-superior portals were created and systematic examination of the joint revealed the following:      -No intra-articular biceps pathology. Intact biceps root attachment. No SLAP tear. A probe was used to bring the zone 2 portion of the biceps into the joint and there was a minimal amount of tenosynovitis seen.  No biceps groove pathology.  No tendinopathy.  -There was however tearing of the posterior inferior labrum from approximately the 8 o'clock position extending anteriorly to the 3 o'clock position. There was a clean separation of the anterior inferior labrum from the glenoid rim with detachment completely and under tensioning of the anterior and posterior bands of the IGHL.  This appeared to be acute in nature and consistent with a traumatic Bankart tear with posterior inferior extension. Again, no SLAP pathology.   -A posterior push-pull maneuver with probing was performing demonstrating an intact subscapularis  -The undersurface of the superior cuff was intact  -There was mild chondral delamination superficially of the humeral head anteriorly otherwise no high-grade chondral defects.  No Hill-Sachs lesion seen.    A shaver was again brought in to clear the field of view and to debride torn labrum for further examination.  A limited chondroplasty of the anterior humeral head also was performed. The anterior-inferior and posterior-inferior labrum was easily  from the glenoid rim with probing and it was felt that this was a clinically significant labral injury.  Given the patient's relatively young age it was felt that repair of this tear would be most appropriate.    In addition to the posterior viewing portal, additional anterior superior and anterior mid glenoid portals were created with cannulas introduced.  A combination of the angled rasp and liberated devices were used to mobilize the capsulo-labral tissue down to visible subscapularis muscle  fibers. A shaver was used also to debride and decorticate the glenoid margin in preparation for the repair.  Using the percutaneous labral repair kit, an accessory posterior-lateral portal was then created for access to the posterior inferior labrum. Two labral tapes were passed and incorporated into 2 PushLock anchors at the 7 and 8:30 positions for posterior-inferior labral repair. Less capsular tissue was taken than usual during passage of the suture to limit capsular shifting and risk for postoperative stiffness.  Attention was then turned anteriorly where 2 additional labral tapes were passed and again incorporated into PushLock anchors at the 5 and 3 o'clock positions for labral repair and limited capsulorrhaphy.  No additional anchors were needed. The humeral head was effectively recentered after repair. No HAGL lesions were seen during the procedure.    A shaver was introduced to further debride the chondral labral tissue margin and remove any loose tissue.    Attention was then turned to the subacromial space where significant hypertrophic bursa was encountered. Through an anterolateral working portal, shaver and cautery devices were introduced to clear the subacromial space of bursa and adhesions. Bursal reflections to the deltoid fascia anteriorly and posteriorly were taken down to further expand this space. This created a nice room with a view. The undersurface of the acromion was exposed with cautery to delineate bony anatomy. Systematic examination of this space revealed the following:    -Subacromial spurring with narrowing of the anterolateral subacromial interval  -Mild degeneration of the otherwise intact CA ligament suspicious for pre-existing outlet impingement.  -A high grade bursal sided supraspinatus tear was seen measuring approximately 1 cm in length at the lateral insertion.  There was only the articular capsular portion of the cuff which remained intact. Decision was made to complete the  tear and repair.    The partial cuff tear was completed to full. The anterior footprint was further prepared with the shaver through an accessory portal while looking from the standard anterolateral portal. The medium sized crescent tear was then repaired with horizontal fibertape sutures and the 2 retention suture limbs originating from a single 5.5 mm Swivelock medial row anchor. These were then crossed and taken laterally to two 4.75 mm Swivelock anchors  for a modified speed bridge configuration.  Care was taken to avoid over tensioning of the cuff. The cuff was repaired to its native position on the greater tuberosity without undue tension. Care was also taken to position the anterolateral knotless anchor posterior to the biceps groove.  The retention suture from this anchor also was passed through the anterior margin of the cuff and tied down to bring down a dog-ear. Following repair, probing of the repair site revealed good tissue apposition to the footprint and good construct stability.    Subacromial decompression was completed using posterior cutting block technique in the standard fashion with a 4.5 mm shahana without difficulty.  The anterior osteophyte was flattened.  Confirmation of adequate resection was confirmed while viewing from the lateral portal and referencing from the posterior acromial undersurface.    The arthroscope was introduced into the intra-articular space once more to visualize the biceps and to ensure no tethering or malpositioning with the cuff repair. The biceps was probed and found to track appropriately through its pulley system and proximal groove.  There was no tethering.  There was no abnormality this the biceps was left intact.    The shoulder and subacromial space were then irrigated and fluid was extravasated using suction.     All portals were reapproximated using 3-0 Nylon. Xeroform and absorbant mepilex pads were placed to cover all incisions.  A polar care shoulder sleeve  was secured followed by a  sling with abduction pillow. The patient was then repositioned supine, extubated and taken to the recovery room where the patient arrived in stable condition with the compartments of the arm and forearm soft and good perfusion in all digits.     POSTOPERATIVE PLAN OF CARE:  -Patient will be discharged home according to protocol.  -Physical Therapy:  Will start therapy right away.  The patient will be at risk for stiffness given her concurrent pathology of both labral and cuff tearing.  Begin passive range of motion now.  No active range of motion for 6-7 weeks.  She will remain in her sling for 7-8 weeks.  No resistive activity for 12 weeks.  -DVT prophylaxis: ASA 81 mg twice a day x 2 weeks.

## 2019-04-25 NOTE — PROGRESS NOTES
Attempted to contact patient by phone x2 this morning and this afternoon. Was unable to reach patient by either phone number that was given by patient's caregiver. Inbox was full on patient's voicemail. Will continue to attempt to contact patient tonight and tomorrow while On[employer] remains in place and is infusing.      Kashif Garcia MD  CA-2  [phone]

## 2019-04-25 NOTE — ANESTHESIA POSTPROCEDURE EVALUATION
Anesthesia Post Evaluation    Patient: Maria D Reid    Procedure(s) Performed: Procedure(s) (LRB):  ARTHROSCOPY, SHOULDER,ROTATOR CUFF REPAIR (Right)  ARTHROSCOPY, SHOULDER, WITH BANKART REPAIR (Right)  CAPSULORRHAPHY (Right)  DECOMPRESSION, SUBACROMIAL SPACE (Right)    Final Anesthesia Type: general  Patient location during evaluation: PACU  Patient participation: Yes- Able to Participate  Level of consciousness: awake and alert and oriented  Post-procedure vital signs: reviewed and stable  Pain management: adequate  Airway patency: patent  PONV status at discharge: No PONV  Anesthetic complications: no      Cardiovascular status: blood pressure returned to baseline  Respiratory status: unassisted  Hydration status: euvolemic  Follow-up not needed.        Patient reblocked by Dr. Rick due to persistent pin with adequate relief.       Vitals Value Taken Time   /73 4/24/2019  6:16 PM   Temp 37.1 °C (98.8 °F) 4/24/2019  6:15 PM   Pulse 94 4/24/2019  6:19 PM   Resp 15 4/24/2019  6:15 PM   SpO2 99 % 4/24/2019  6:19 PM   Vitals shown include unvalidated device data.      Event Time     Out of Recovery 16:32:28          Pain/Domi Score: Pain Rating Prior to Med Admin: 10 (4/24/2019  4:14 PM)  Domi Score: 10 (4/24/2019  4:35 PM)

## 2019-04-25 NOTE — ANESTHESIA POST-OP PAIN MANAGEMENT
2019      Received call from patient in regards to numbness/weakness in operative arm with additional swelling and redness. She had already spoken with ortho who reassured her that these were normal post-operative issues. Per our conversation, I also reassured her that the interscalene is relieving her pain at the expense of sensation and motor strength. The catheter site remains c/d/i without redness or warmth and she is afebrile. All questions answered and informed her that we would follow-up tomorrow.    Bhavesh Ryan MD  CA1/PGY2  [phone]

## 2019-04-26 NOTE — ADDENDUM NOTE
Addendum  created 04/26/19 1310 by Cecile Narvaez RN    Intraprocedure Event edited, LDA properties accepted

## 2019-04-26 NOTE — ANESTHESIA POST-OP PAIN MANAGEMENT
Spoke with Ms. Reid over the phone concerning PNC. Catheter removed without issue, tip intact. Denies erythema, swelling or significant bleeding around the insertion site. Denies any LAST symptoms currently or during the last 24h. Pt. Overall very satisfied with PNC.     Alfred Payne  [facility]  P: [phone]

## 2019-04-29 ENCOUNTER — TELEPHONE (OUTPATIENT)
Dept: SPORTS MEDICINE | Facility: CLINIC | Age: 44
End: 2019-04-29

## 2019-04-29 ENCOUNTER — CLINICAL SUPPORT (OUTPATIENT)
Dept: REHABILITATION | Facility: HOSPITAL | Age: 44
End: 2019-04-29
Payer: COMMERCIAL

## 2019-04-29 DIAGNOSIS — M75.111 INCOMPLETE TEAR OF RIGHT ROTATOR CUFF: Primary | ICD-10-CM

## 2019-04-29 DIAGNOSIS — R53.1 WEAKNESS: ICD-10-CM

## 2019-04-29 DIAGNOSIS — M25.60 RANGE OF MOTION DEFICIT: ICD-10-CM

## 2019-04-29 PROCEDURE — 97140 MANUAL THERAPY 1/> REGIONS: CPT

## 2019-04-29 PROCEDURE — 97161 PT EVAL LOW COMPLEX 20 MIN: CPT

## 2019-04-29 PROCEDURE — 97110 THERAPEUTIC EXERCISES: CPT

## 2019-04-29 NOTE — TELEPHONE ENCOUNTER
Received a request from  [employer] for patients medical records. The request was sent to our medical record department.    Candelaria Ledesma MA  [facility]

## 2019-04-29 NOTE — PROGRESS NOTES
[facility]  Physical Therapy Initial Evaluation    Name: Maria D Reid  Clinic Number: [id number]    Therapy Diagnosis:   Encounter Diagnoses   Name Primary?    Incomplete tear of right rotator cuff Yes    Weakness     Range of motion deficit      Physician: Cha Stoll PA-C    Physician Orders: PT Eval and Treat   RIGHT shoulder arthroscopic rotator cuff repair, SAD  on 4/24/2019.      POSTOPERATIVE PLAN OF CARE:  -Patient will be discharged home according to protocol.  -Physical Therapy:  Will start therapy right away.  The patient will be at risk for stiffness given her concurrent pathology of both labral and cuff tearing.  Begin passive range of motion now.  No active range of motion for 6-7 weeks.  She will remain in her sling for 7-8 weeks.  No resistive activity for 12 weeks.    Medical Diagnosis from Referral: M75.111 (ICD-10-CM) - Incomplete tear of right rotator cuff  Surgery Date: 4/24/2019  - 6 weeks (AROM) = 6/10/2019  - 12 weeks (Resistance) = 7/22/2019  Evaluation Date: 4/29/2019  Authorization Period Expiration: 12/31/2019  Plan of Care Expiration: 8/19/2019  Visit # / Visits authorized: 1/ 20    Time In: 1:10  Time Out: 2:00  Total Billable Time: 50 minutes    Precautions: HTN    Subjective   Date of onset: Approx 3 weeks ago  History of current condition - Maria D reports: she tripped in the quarter and hurt her shoulder, she immediately went to the ER and was referred to Dr. Milligan. Surgery performed on 4/24/2019.   Patient is RHD.        Past Medical History:   Diagnosis Date    Allergy     Hypertension      Maria D Reid  has a past surgical history that includes Gastric bypass; Knee surgery (Right); Shoulder arthroscopy (Right, 4/24/2019); Shoulder arthroscopy w/ Bankhart procedure (Right, 4/24/2019); and Decompression of subacromial space (Right, 4/24/2019).    Maria D has a current medication list which includes the following prescription(s):  amlodipine, aspirin, azelastine, cetirizine, dymista, fluticasone propionate, hydrocodone-acetaminophen, ibuprofen, layolis fe, meloxicam, olmesartan-hydrochlorothiazide, oxycodone, rizatriptan, and tizanidine.    Review of patient's allergies indicates:  No Known Allergies     Imaging, MRI studies: 3/25/2019  FINDINGS:  Rotator cuff: Thickened, heterogeneous intrasubstance signal of the supraspinatus and infraspinatus in keeping with tendinosis.  Focal full-thickness defect of anterior insertional fibers of the supraspinatus.  The subscapularis and teres minor appear intact.  Muscle bulk is maintained.    Labrum: Degenerative fraying with 6 mm posterior inferior paralabral cyst.    Biceps: Long head biceps tendon is intact with mild intermediate signal.    Bone: No fracture or infiltrative marrow process.    Acromioclavicular joint: There is widening of the AC joint interval with disruption of the superior acromioclavicular ligament and suspected stretch injury versus tear of the inferior acromioclavicular ligament.  Heterogeneous signal and attenuation of the coracoclavicular complex with limited visualization of the coronoid and trapezoid ligaments, suspected high-grade tear.  There is additional marked pericapsular edema and fluid extending anteriorly along the coracoid as well as abnormal intramuscular signal and fluid tracking within the distal trapezius and anterior deltoid.    Cartilage: Articular cartilage of the glenohumeral joint is grossly intact.    Miscellaneous: Small volume subacromial/subdeltoid fluid.  Scattered subcutaneous, soft tissue edema about the shoulder.      Impression       High grade II/III AC joint separation as detailed.    Interstitial tearing and edema of the anterior deltoid and distal trapezius.    Small volume subacromial/subdeltoid fluid, possible bursitis.    Focal full-thickness tear at the anterior leading edge of the distal supraspinatus tendon with superimposed cuff  tendinosis.         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   Pt will be moving to [place], [place] for work but will push back move further until she is healed - her ideal timeline would be 4-6 weeks but she understands that might not be realistic.   Prior Level of Function: Independent  Current Level of Function: Difficulty with ADLs/IADLs due to being RHD.     Pain:  Current 4/10, worst 10/10, best 1/10   Location: right shoulder   Description: Sharp/Shooting; [place]y  Aggravating Factors: Moving the wrong way  Easing Factors: pain medication, ice and Tylenol    Pts goals: get her arm back to normal so she can move to [place]    Objective     Observation: Pt is stated age, pleasant, no acute distress, oriented x3.     Posture: Pt presents with sling and pillow per protocol      Active Range of Motion: Measured in degrees    Shoulder Left Right   Flexion 160 NT   Abduction 165 NT   Functional ER: (L) T3   Functional IR: (L) T7        Passive Range of Motion: Measured in degrees    Shoulder Right   Flexion 90   Abduction 90   ER at 0 5       Upper Extremity Strength    Shoulder Left Right   Shoulder flexion: 5/5 NT   Shoulder Abduction: 4+/5 NT   Shoulder ER 5/5 NT   Shoulder IR 5/5 NT     Special Tests:  Not indicated as pt is post-op    Palpation Shoulder:  Moderate muscle restriction with guarding present      CMS Impairment/Limitation/Restriction for FOTO Shoulder Survey    Therapist reviewed FOTO scores for Maria D Reid on 4/29/2019.   FOTO documents entered into [employer] - see Media section.    Limitation Score: 77%  Category: Other    Current : CL = least 60% but < 80% impaired, limited or restricted  Goal: CJ = at least 20% but < 40% impaired, limited or restricted         TREATMENT   Treatment Time In: 1:30  Treatment Time Out:  2:00  Total Treatment time separate from Evaluation: 31 minutes    Maria D received therapeutic exercises to develop ROM for 8 minutes including:  HEP review for current stage of healing    Maria D received the following manual therapy techniques: PROM were applied to the: R shoulder for 23 minutes, including:  - PROM flexion, abduction, ER per pt tolerance       Home Exercises and Patient Education Provided    Education provided:   - Findings of evaluation, prognosis, PT plan of care, HEP    Written Home Exercises Provided: Patient instructed to cont prior HEP.  Exercises were reviewed and Maria D was able to demonstrate them prior to the end of the session.  Maria D demonstrated good  understanding of the education provided.     See EMR under Patient Instructions for exercises provided 4/29/2019.    Assessment   Mraia D is a 43 y.o. female referred to outpatient Physical Therapy with a medical diagnosis of M75.111 (ICD-10-CM) - Incomplete tear of right rotator cuff. Pt presents with decreased ROM, weakness, abnormal posture due to sling per protocol, and muscle guarding with PROM. Pt requires skilled intervention to ensure optimal recovery from surgery to allow return to PLOF.     Pt prognosis is Good.   Pt will benefit from skilled outpatient Physical Therapy to address the deficits stated above and in the chart below, provide pt/family education, and to maximize pt's level of independence.     Plan of care discussed with patient: Yes  Pt's spiritual, cultural and educational needs considered and patient is agreeable to the plan of care and goals as stated below:     Anticipated Barriers for therapy: Increased risk for stiffness due to RTC and labral pathology.     Medical Necessity is demonstrated by the following  History  Co-morbidities and personal factors that may impact the plan of care Co-morbidities:   HTN    Personal Factors:   age  coping style     low   Examination  Body Structures and Functions, activity  limitations and participation restrictions that may impact the plan of care Body Regions:   neck  upper extremities    Body Systems:    gross symmetry  ROM  strength  gross coordinated movement  motor control    Participation Restrictions:   Lifting, carrying, pushing, pulling    Activity limitations:   Learning and applying knowledge  no deficits    General Tasks and Commands  no deficits    Communication  no deficits    Mobility  lifting and carrying objects  fine hand use (grasping/picking up)  driving (bike, car, motorcycle)    Self care  washing oneself (bathing, drying, washing hands)  caring for body parts (brushing teeth, shaving, grooming)  dressing  eating    Domestic Life  shopping  cooking  doing house work (cleaning house, washing dishes, laundry)  assisting others    Interactions/Relationships  no deficits    Life Areas  employment    Community and Social Life  community life  recreation and leisure         moderate   Clinical Presentation stable and uncomplicated low   Decision Making/ Complexity Score: low     Goals:  Short Term Goals: 6 weeks   - Pt will demonstrate excellent knowledge and adherence to HEP to facilitate optimal recovery.  - Pt will demonstrate full PROM per protocol to allow for progression into AROM needed for increased functional use of RUE.     Long Term Goals: 16 weeks   - Pt will report FOTO score of 34% limited or less indicating clinically relevant increase in functional tolerance.  - Pt will demonstrate full AROM of R shoulder to allow for increased functional activity tolernace   - Pt will demonstrate at least 4/5 MMT of R shoulder musculature for increased stability needed for functional activity     Plan   Plan of care Certification: 4/29/2019 to 8/19/2019.    Outpatient Physical Therapy 3 times weekly for first 2 weeks, decreasing if appropriate to twice weekly for total of for 16 weeks to include the following interventions: Electrical Stimulation PRN, Manual Therapy,  Moist Heat/ Ice, Neuromuscular Re-ed, Patient Education, Therapeutic Activites and Therapeutic Exercise.     Cony Miller, PT, DPT

## 2019-04-30 NOTE — ADDENDUM NOTE
Addendum  created 04/30/19 1147 by Jaz Smith MD    Attestation recorded in Intraprocedure, Intraprocedure Attestations filed

## 2019-05-01 ENCOUNTER — CLINICAL SUPPORT (OUTPATIENT)
Dept: REHABILITATION | Facility: HOSPITAL | Age: 44
End: 2019-05-01
Payer: COMMERCIAL

## 2019-05-01 DIAGNOSIS — R53.1 WEAKNESS: ICD-10-CM

## 2019-05-01 DIAGNOSIS — M25.60 RANGE OF MOTION DEFICIT: ICD-10-CM

## 2019-05-01 PROCEDURE — 97140 MANUAL THERAPY 1/> REGIONS: CPT

## 2019-05-01 PROCEDURE — 97110 THERAPEUTIC EXERCISES: CPT

## 2019-05-01 NOTE — PROGRESS NOTES
Physical Therapy Daily Treatment Note     Name: Maria D Reid  [facility] Number: [id number]    Therapy Diagnosis:   Encounter Diagnoses   Name Primary?    Weakness     Range of motion deficit      Physician: Cha Stoll PA-C    Visit Date: 5/1/2019    Physician Orders: PT Eval and Treat   RIGHT shoulder arthroscopic rotator cuff repair, SAD  on 4/24/2019.      POSTOPERATIVE PLAN OF CARE:  -Patient will be discharged home according to protocol.  -Physical Therapy:  Will start therapy right away.  The patient will be at risk for stiffness given her concurrent pathology of both labral and cuff tearing.  Begin passive range of motion now.  No active range of motion for 6-7 weeks.  She will remain in her sling for 7-8 weeks.  No resistive activity for 12 weeks.    Per discussion with MD: Passive range in flexion and scaption, ER to 30 deg. Pendulums ok. AROM/AAROM at 6-7 weeks.     Medical Diagnosis from Referral: M75.111 (ICD-10-CM) - Incomplete tear of right rotator cuff  Surgery Date: 4/24/2019  - 6 weeks (AROM) = 6/10/2019  - 12 weeks (Resistance) = 7/22/2019  Evaluation Date: 4/29/2019  Authorization Period Expiration: 12/31/2019  Plan of Care Expiration: 8/19/2019  Visit # / Visits authorized: 1/ 20     Time In: 10:00  Time Out: 11:00  Total Billable Time: 60 minutes     Precautions: HTN      Subjective     Pt reports: She felt fine after her previous session.  She was compliant with home exercise program.  Response to previous treatment: Tolerated well   Functional change: NA    Pain: Patient unable to verbalize number  Location: right shoulder      Objective     Maria D received therapeutic exercises to develop ROM for 23 minutes including:  - AROM elbow flexion/extension 30x   - AROM pronation/supination 30x   - wrist extension/flexion 1# 30x each     Maria D received the following manual therapy techniques: PROM were applied to the: (R) shoulder for 23 minutes, including:  - PROM flexion and scaption per  protocol and pt tolerance.     Maria D received hot pack for 10 minutes to (R) shoulder.    Maria D received cold pack for 10 minutes to (R) shoulder.      Home Exercises Provided and Patient Education Provided     Education provided:   - Review prognosis and PT POC.     Written Home Exercises Provided: Patient instructed to cont prior HEP.  Exercises were reviewed and Maria D was able to demonstrate them prior to the end of the session.  Maria D demonstrated good  understanding of the education provided.     See EMR under Patient Instructions for exercises provided prior visit.    Assessment     Session focused on AROM for elbow and wrist and PROM for (R) shoulder per protocol. Pt demonstrates significantly improved tolerance to PROM compared to initial eval. No symptom provocation during session. Patient reported feeling well upon departure.      Maria D is progressing well towards her goals.   Pt prognosis is Good.     Pt will continue to benefit from skilled outpatient physical therapy to address the deficits listed in the problem list box on initial evaluation, provide pt/family education and to maximize pt's level of independence in the home and community environment.     Pt's spiritual, cultural and educational needs considered and pt agreeable to plan of care and goals.    Anticipated barriers to physical therapy: Increased risk for stiffness due to RTC and labral pathology.       Goals:  Short Term Goals: 6 weeks   - Pt will demonstrate excellent knowledge and adherence to HEP to facilitate optimal recovery.  - Pt will demonstrate full PROM per protocol to allow for progression into AROM needed for increased functional use of RUE.      Long Term Goals: 16 weeks   - Pt will report FOTO score of 34% limited or less indicating clinically relevant increase in functional tolerance.  - Pt will demonstrate full AROM of R shoulder to allow for increased functional activity tolernace   - Pt will demonstrate at least 4/5  MMT of R shoulder musculature for increased stability needed for functional activity       Plan     Continue with emphasis on PROM per protocol.   Per discussion with MD: Passive range in flexion and scaption, ER to 30 deg. Pendulums ok. AROM/AAROM at 6-7 weeks.    Cony Miller, PT, DPT

## 2019-05-03 ENCOUNTER — CLINICAL SUPPORT (OUTPATIENT)
Dept: REHABILITATION | Facility: HOSPITAL | Age: 44
End: 2019-05-03
Payer: COMMERCIAL

## 2019-05-03 DIAGNOSIS — M25.60 RANGE OF MOTION DEFICIT: ICD-10-CM

## 2019-05-03 DIAGNOSIS — R53.1 WEAKNESS: ICD-10-CM

## 2019-05-03 PROCEDURE — 97110 THERAPEUTIC EXERCISES: CPT | Performed by: PHYSICAL THERAPIST

## 2019-05-03 PROCEDURE — 97140 MANUAL THERAPY 1/> REGIONS: CPT | Performed by: PHYSICAL THERAPIST

## 2019-05-03 NOTE — PROGRESS NOTES
Physical Therapy Daily Treatment Note     Name: Maria D Reid  [facility] Number: [id number]    Therapy Diagnosis:   Encounter Diagnoses   Name Primary?    Weakness     Range of motion deficit      Physician: Cha Stoll PA-C    Visit Date: 5/3/2019    Physician Orders: PT Eval and Treat   RIGHT shoulder arthroscopic rotator cuff repair, SAD  on 4/24/2019.      POSTOPERATIVE PLAN OF CARE:  -Patient will be discharged home according to protocol.  -Physical Therapy:  Will start therapy right away.  The patient will be at risk for stiffness given her concurrent pathology of both labral and cuff tearing.  Begin passive range of motion now.  No active range of motion for 6-7 weeks.  She will remain in her sling for 7-8 weeks.  No resistive activity for 12 weeks.    Per discussion with MD: Passive range in flexion and scaption, ER to 30 deg. Pendulums ok. AROM/AAROM at 6-7 weeks.     Medical Diagnosis from Referral: M75.111 (ICD-10-CM) - Incomplete tear of right rotator cuff  Surgery Date: 4/24/2019  - 6 weeks (AROM) = 6/10/2019  - 12 weeks (Resistance) = 7/22/2019  Evaluation Date: 4/29/2019  Authorization Period Expiration: 12/31/2019  Plan of Care Expiration: 8/19/2019  Visit # / Visits authorized: 1/ 20     Time In: 09:01  Time Out: 09:54  Total Billable Time: 53 minutes     Precautions: HTN      Subjective     Pt reports: She is sore today after doing too much yesterday, ran a lot of errands after therapy.   She was compliant with home exercise program.  Response to previous treatment: Tolerated well with expected soreness  Functional change: NA    Pain: Less than she expected, minimal  Location: right shoulder      Objective     Maria D received therapeutic exercises to develop ROM for 19 minutes including:  - AROM elbow flexion/extension 30x   - AROM pronation/supination 30x   - wrist extension/flexion 1# 30x each   -Scapular retraction with wedge between scapula  -Pendulums 4 ways 2' each    Maria D received  the following manual therapy techniques: PROM were applied to the: (R) shoulder for 24 minutes, including:  - PROM flexion and scaption per protocol and pt tolerance.  - PROM ER to 15 degrees as tolerated  - Sidelying scapular framing   - GR I-II oscillations for pain relief    Maria D received hot pack for 10 minutes to (R) shoulder.    Maria D received cold pack for 10 minutes to (R) shoulder.      Home Exercises Provided and Patient Education Provided     Education provided:   - Review prognosis and PT POC. Informed patient she will be in the sling 7-8 weeks.    Written Home Exercises Provided: Patient instructed to cont prior HEP. Added pendulums  Exercises were reviewed and Maria D was able to demonstrate them prior to the end of the session.  Maria D demonstrated good  understanding of the education provided.     See EMR under Patient Instructions for exercises provided prior visit.    Assessment     Continue with PROM per protocol provided following discussion with MD. Pendulums well tolerated with minimal discomfort only in forward/backward. Good tolerance to all AROM exercises and strengthening at the elbow.     Maria D is progressing well towards her goals.   Pt prognosis is Good.     Pt will continue to benefit from skilled outpatient physical therapy to address the deficits listed in the problem list box on initial evaluation, provide pt/family education and to maximize pt's level of independence in the home and community environment.     Pt's spiritual, cultural and educational needs considered and pt agreeable to plan of care and goals.    Anticipated barriers to physical therapy: Increased risk for stiffness due to RTC and labral pathology.       Goals:  Short Term Goals: 6 weeks   - Pt will demonstrate excellent knowledge and adherence to HEP to facilitate optimal recovery. (met)  - Pt will demonstrate full PROM per protocol to allow for progression into AROM needed for increased functional use of RUE.  (progressing, not me)     Long Term Goals: 16 weeks   - Pt will report FOTO score of 34% limited or less indicating clinically relevant increase in functional tolerance. (progressing, not met)  - Pt will demonstrate full AROM of R shoulder to allow for increased functional activity tolernace (progressing, not met)  - Pt will demonstrate at least 4/5 MMT of R shoulder musculature for increased stability needed for functional activity (progressing, not met)       Plan   Plan of care Certification: 4/29/2019 to 8/19/2019.     Outpatient Physical Therapy 3 times weekly for first 2 weeks, decreasing if appropriate to twice weekly for total of for 16 weeks to include the following interventions: Electrical Stimulation PRN, Manual Therapy, Moist Heat/ Ice, Neuromuscular Re-ed, Patient Education, Therapeutic Activites and Therapeutic Exercise.      Continue with PROM within protocol.    Per discussion with MD: Passive range in flexion and scaption, ER to 30 deg. Pendulums ok. AROM/AAROM at 6-7 weeks.    Sulaiman Cooper, PT, DPT

## 2019-05-07 ENCOUNTER — CLINICAL SUPPORT (OUTPATIENT)
Dept: REHABILITATION | Facility: HOSPITAL | Age: 44
End: 2019-05-07
Payer: COMMERCIAL

## 2019-05-07 ENCOUNTER — OFFICE VISIT (OUTPATIENT)
Dept: SPORTS MEDICINE | Facility: CLINIC | Age: 44
End: 2019-05-07
Payer: COMMERCIAL

## 2019-05-07 VITALS
BODY MASS INDEX: 33.8 KG/M2 | SYSTOLIC BLOOD PRESSURE: 148 MMHG | HEIGHT: 68 IN | WEIGHT: 223 LBS | DIASTOLIC BLOOD PRESSURE: 100 MMHG | HEART RATE: 76 BPM

## 2019-05-07 DIAGNOSIS — M25.60 RANGE OF MOTION DEFICIT: ICD-10-CM

## 2019-05-07 DIAGNOSIS — Z47.89 SURGICAL AFTERCARE, MUSCULOSKELETAL SYSTEM: Primary | ICD-10-CM

## 2019-05-07 DIAGNOSIS — R53.1 WEAKNESS: ICD-10-CM

## 2019-05-07 PROCEDURE — 99999 PR PBB SHADOW E&M-EST. PATIENT-LVL III: CPT | Mod: PBBFAC,,, | Performed by: ORTHOPAEDIC SURGERY

## 2019-05-07 PROCEDURE — 97110 THERAPEUTIC EXERCISES: CPT | Performed by: PHYSICAL THERAPIST

## 2019-05-07 PROCEDURE — 97140 MANUAL THERAPY 1/> REGIONS: CPT | Performed by: PHYSICAL THERAPIST

## 2019-05-07 PROCEDURE — 99024 POSTOP FOLLOW-UP VISIT: CPT | Mod: S$GLB,,, | Performed by: ORTHOPAEDIC SURGERY

## 2019-05-07 PROCEDURE — 99024 PR POST-OP FOLLOW-UP VISIT: ICD-10-PCS | Mod: S$GLB,,, | Performed by: ORTHOPAEDIC SURGERY

## 2019-05-07 PROCEDURE — 99999 PR PBB SHADOW E&M-EST. PATIENT-LVL III: ICD-10-PCS | Mod: PBBFAC,,, | Performed by: ORTHOPAEDIC SURGERY

## 2019-05-07 RX ORDER — CHLORZOXAZONE 500 MG/1
TABLET ORAL
Qty: 60 TABLET | Refills: 0 | Status: SHIPPED | OUTPATIENT
Start: 2019-05-07 | End: 2019-06-03 | Stop reason: SDUPTHER

## 2019-05-07 NOTE — PROGRESS NOTES
S:Maria D Reid presents for initial post-operative evaluation.     DATE OF PROCEDURE: 4/24/2019     OPERATION:   Right  1. Shoulder arthroscopic Bankart repair   2. Shoulder arthroscopic posterior inferior labral repair  3. Shoulder arthroscopic rotator cuff repair  4. Shoulder arthroscopic extensive debridement (anterior, posterior glenohumeral joint, subacromial space)   5. Shoulder arthroscopic subacromial bursectomy with decompression  6. Shoulder non-operative treatment of AC joint separation    S:  The patient returns today for scheduled follow-up.  Two weeks out from surgery. States the pain is well controlled and seems to be improving.  Describe some muscular soreness and tightness.  No numbness or tingling.  Has started physical therapy.  No instability symptoms    O: RUE - The incisions are healing well. No signs of infection.  Sutures were removed and Steri-Strips applied.  Passive forward elevation to 70-80 degrees with some discomfort.  Passive external rotation with arm at side to 10°.  Sensation intact to light touch over the axillary nerve distribution.  Full elbow flexion. Motor and sensory intact to the right hand    A/P:  Scope pictures were reviewed.  This is a very unique case with combined significant labral and rotator cuff tearing.  Repair was performed for each. The patient will be at risk for significant postoperative stiffness.  We have adjusted her postoperative rehab protocol with this in mind.  Continue passive range of motion within the limits of the protocol.  No active range of motion for now.  May start that at 7 weeks.  No significant resistive activity for 12 weeks.  No biceps resistance activity for 8 weeks postop.  Must remain in her sling for a total of 7 8 weeks postop.  All questions answered.  Hold her out of work for another 4 weeks.

## 2019-05-07 NOTE — PROGRESS NOTES
Physical Therapy Daily Treatment Note     Name: Maria D Reid  [facility] Number: [id number]    Therapy Diagnosis:   Encounter Diagnoses   Name Primary?    Weakness     Range of motion deficit      Physician: Cha Stoll PA-C    Visit Date: 5/7/2019    Physician Orders: PT Eval and Treat   RIGHT shoulder arthroscopic rotator cuff repair, SAD  on 4/24/2019.      POSTOPERATIVE PLAN OF CARE:  -Patient will be discharged home according to protocol.  -Physical Therapy:  Will start therapy right away.  The patient will be at risk for stiffness given her concurrent pathology of both labral and cuff tearing.  Begin passive range of motion now.  No active range of motion for 6-7 weeks.  She will remain in her sling for 7-8 weeks.  No resistive activity for 12 weeks.    Per discussion with MD: Passive range in flexion and scaption, ER to 30 deg. Pendulums ok. AROM/AAROM at 6-7 weeks.    5/7/2019  Continue passive range of motion within the limits of the protocol.  No active range of motion for now.  May start that at 7 weeks.  No significant resistive activity for 12 weeks.  No biceps resistance activity for 8 weeks postop.  Must remain in her sling for a total of 7 8 weeks postop.  All questions answered.  Hold her out of work for another 4 weeks.        Medical Diagnosis from Referral: M75.111 (ICD-10-CM) - Incomplete tear of right rotator cuff  Surgery Date: 4/24/2019  - 6 weeks (AROM) = 6/10/2019  - 12 weeks (Resistance) = 7/22/2019  Evaluation Date: 4/29/2019  Authorization Period Expiration: 12/31/2019  Plan of Care Expiration: 8/19/2019  Visit # / Visits authorized: 1/ 20     Time In: 1430  Time Out: 1516  Total Billable Time:  minutes     Precautions: HTN      Subjective     Pt reports: She had some aches over the weekend but nothing terrible. Just saw MD who was pleased with her progress. Holding her out of work for another 4 weeks. Complaint with pendulums at home and scap retraction  She was compliant with  home exercise program.  Response to previous treatment: Ache, soreness  Functional change: NA    Pain: 3/10  Location: right shoulder      Objective     Maria D received therapeutic exercises to develop ROM for 25 minutes including:  - AROM elbow flexion/extension 30x   - AROM pronation/supination 30x   - wrist extension/flexion 1# 30x each   -Scapular retraction with wedge between scapula  -Pendulums 4 ways 2' each    Maria D received the following manual therapy techniques: PROM were applied to the: (R) shoulder for 21 minutes, including:  - PROM flexion and scaption per protocol and pt tolerance.  - PROM ER to 15 degrees as tolerated  - Sidelying scapular framing   - GR I-II oscillations for pain relief    Maria D received hot pack for 10 minutes to (R) shoulder.    Maria D received cold pack for 10 minutes to (R) shoulder.      Home Exercises Provided and Patient Education Provided     Education provided:   - Review prognosis and PT POC    Written Home Exercises Provided: Patient instructed to cont prior HEP.   Exercises were reviewed and Maria D was able to demonstrate them prior to the end of the session.  Maria D demonstrated good  understanding of the education provided.     See EMR under Patient Instructions for exercises provided prior visit.    Assessment     Soreness with sidelying mobilizations over the teres. Increased muscle spasm/guarding with manual into flexion. Continue to progress within protocol provided by MD.   Maria D is progressing well towards her goals.   Pt prognosis is Good.     Pt will continue to benefit from skilled outpatient physical therapy to address the deficits listed in the problem list box on initial evaluation, provide pt/family education and to maximize pt's level of independence in the home and community environment.     Pt's spiritual, cultural and educational needs considered and pt agreeable to plan of care and goals.    Anticipated barriers to physical therapy: Increased risk  for stiffness due to RTC and labral pathology.       Goals:  Short Term Goals: 6 weeks   - Pt will demonstrate excellent knowledge and adherence to HEP to facilitate optimal recovery. (met)  - Pt will demonstrate full PROM per protocol to allow for progression into AROM needed for increased functional use of RUE. (progressing, not me)     Long Term Goals: 16 weeks   - Pt will report FOTO score of 34% limited or less indicating clinically relevant increase in functional tolerance. (progressing, not met)  - Pt will demonstrate full AROM of R shoulder to allow for increased functional activity tolernace (progressing, not met)  - Pt will demonstrate at least 4/5 MMT of R shoulder musculature for increased stability needed for functional activity (progressing, not met)       Plan   Plan of care Certification: 4/29/2019 to 8/19/2019.     Outpatient Physical Therapy 3 times weekly for first 2 weeks, decreasing if appropriate to twice weekly for total of for 16 weeks to include the following interventions: Electrical Stimulation PRN, Manual Therapy, Moist Heat/ Ice, Neuromuscular Re-ed, Patient Education, Therapeutic Activites and Therapeutic Exercise.      Per discussion with MD: Passive range in flexion and scaption, ER to 30 deg. Pendulums ok. AROM/AAROM at 6-7 weeks.    Continue passive range of motion within the limits of the protocol.  No active range of motion for now.  May start that at 7 weeks.  No significant resistive activity for 12 weeks.  No biceps resistance activity for 8 weeks postop.  Must remain in her sling for a total of 7 8 weeks postop.  All questions answered.  Hold her out of work for another 4 weeks.       Sulaiman Cooper, PT, DPT

## 2019-05-08 ENCOUNTER — CLINICAL SUPPORT (OUTPATIENT)
Dept: REHABILITATION | Facility: HOSPITAL | Age: 44
End: 2019-05-08
Payer: COMMERCIAL

## 2019-05-08 DIAGNOSIS — R53.1 WEAKNESS: ICD-10-CM

## 2019-05-08 DIAGNOSIS — M25.60 RANGE OF MOTION DEFICIT: ICD-10-CM

## 2019-05-08 PROCEDURE — 97110 THERAPEUTIC EXERCISES: CPT | Performed by: PHYSICAL THERAPIST

## 2019-05-08 PROCEDURE — 97140 MANUAL THERAPY 1/> REGIONS: CPT | Performed by: PHYSICAL THERAPIST

## 2019-05-08 NOTE — PROGRESS NOTES
Physical Therapy Daily Treatment Note     Name: Maria D Reid  [facility] Number: [id number]    Therapy Diagnosis:   Encounter Diagnoses   Name Primary?    Weakness     Range of motion deficit      Physician: Cha Stoll PA-C    Visit Date: 5/8/2019    Physician Orders: PT Eval and Treat   RIGHT shoulder arthroscopic rotator cuff repair, SAD  on 4/24/2019.      POSTOPERATIVE PLAN OF CARE:  -Patient will be discharged home according to protocol.  -Physical Therapy:  Will start therapy right away.  The patient will be at risk for stiffness given her concurrent pathology of both labral and cuff tearing.  Begin passive range of motion now.  No active range of motion for 6-7 weeks.  She will remain in her sling for 7-8 weeks.  No resistive activity for 12 weeks.    Per discussion with MD: Passive range in flexion and scaption, ER to 30 deg. Pendulums ok. AROM/AAROM at 6-7 weeks.    5/7/2019  Continue passive range of motion within the limits of the protocol.  No active range of motion for now.  May start that at 7 weeks.  No significant resistive activity for 12 weeks.  No biceps resistance activity for 8 weeks postop.  Must remain in her sling for a total of 7 8 weeks postop.  All questions answered.  Hold her out of work for another 4 weeks.        Medical Diagnosis from Referral: M75.111 (ICD-10-CM) - Incomplete tear of right rotator cuff  Surgery Date: 4/24/2019  - 6 weeks (AROM) = 6/10/2019  - 12 weeks (Resistance) = 7/22/2019  Evaluation Date: 4/29/2019  Authorization Period Expiration: 12/31/2019  Plan of Care Expiration: 8/19/2019  Visit # / Visits authorized: 5/ 20     Time In: 1304  Time Out: 1351  Total Billable Time: 47 minutes     Precautions: HTN      Subjective     Pt reports: She has trouble sleeping at night. She did fall asleep until 4-5am and woke up barely in time for therapy. She reports minor aches but nothing bad.  She was compliant with home exercise program.  Response to previous  treatment: Ache, soreness  Functional change: NA    Pain: 3/10  Location: right shoulder      Objective     Maria D received therapeutic exercises to develop ROM for 25 minutes including:  - AROM elbow flexion/extension 30x   - AROM pronation/supination 30x   - wrist extension/flexion 1# 30x each   -Scapular retraction sidelying arm by her side  -Pendulums 4 ways 2' each  - Bicep curl open hand - no weight    Maria D received the following manual therapy techniques: PROM were applied to the: (R) shoulder for 22 minutes, including:  - PROM flexion and scaption per protocol and pt tolerance.  - PROM ER to 20 degrees as tolerated  - Sidelying scapular framing   - GR I-II oscillations for pain relief    Maria D received hot pack for 0 minutes to (R) shoulder.    Maria D received cold pack for 0 minutes to (R) shoulder.      Home Exercises Provided and Patient Education Provided     Education provided:   - Reviewed when 8 weeks post-op will be for her work schedule    Written Home Exercises Provided: Patient instructed to cont prior HEP.   Exercises were reviewed and Maria D was able to demonstrate them prior to the end of the session.  Maria D demonstrated good  understanding of the education provided.     See EMR under Patient Instructions for exercises provided prior visit.    Assessment      Improved flexion and scaption with PROM mobilizations today. She had decreased muscle guarding or discomfort in all planes. Pendulums are pain free in all directions.   Maria D is progressing well towards her goals.   Pt prognosis is Good.     Pt will continue to benefit from skilled outpatient physical therapy to address the deficits listed in the problem list box on initial evaluation, provide pt/family education and to maximize pt's level of independence in the home and community environment.     Pt's spiritual, cultural and educational needs considered and pt agreeable to plan of care and goals.    Anticipated barriers to physical  therapy: Increased risk for stiffness due to RTC and labral pathology.       Goals:  Short Term Goals: 6 weeks   - Pt will demonstrate excellent knowledge and adherence to HEP to facilitate optimal recovery. (met)  - Pt will demonstrate full PROM per protocol to allow for progression into AROM needed for increased functional use of RUE. (progressing, not me)     Long Term Goals: 16 weeks   - Pt will report FOTO score of 34% limited or less indicating clinically relevant increase in functional tolerance. (progressing, not met)  - Pt will demonstrate full AROM of R shoulder to allow for increased functional activity tolernace (progressing, not met)  - Pt will demonstrate at least 4/5 MMT of R shoulder musculature for increased stability needed for functional activity (progressing, not met)       Plan   Plan of care Certification: 4/29/2019 to 8/19/2019.     Outpatient Physical Therapy 3 times weekly for first 2 weeks, decreasing if appropriate to twice weekly for total of for 16 weeks to include the following interventions: Electrical Stimulation PRN, Manual Therapy, Moist Heat/ Ice, Neuromuscular Re-ed, Patient Education, Therapeutic Activites and Therapeutic Exercise.      Per discussion with MD: Passive range in flexion and scaption, ER to 30 deg. Pendulums ok. AROM/AAROM at 6-7 weeks.    Continue passive range of motion within the limits of the protocol.  No active range of motion for now.  May start that at 7 weeks.  No significant resistive activity for 12 weeks.  No biceps resistance activity for 8 weeks postop.  Must remain in her sling for a total of 7 8 weeks postop.  All questions answered.  Hold her out of work for another 4 weeks.       Sulaiman Cooper, PT, DPT

## 2019-05-09 ENCOUNTER — TELEPHONE (OUTPATIENT)
Dept: SPORTS MEDICINE | Facility: CLINIC | Age: 44
End: 2019-05-09

## 2019-05-09 NOTE — TELEPHONE ENCOUNTER
Called maria d at the number that was given to me in a message. Someone advised no maria d at that business and they didn't have a clue about needing an ICD-10 CODE AND CPT for patient Maria D Reid.  Tried calling patient to get more information but her vm is full and I wasn't able to leave a message.         ----- Message from Catherine Mc sent at 5/9/2019 10:05 AM CDT -----  Contact: Maria D/[facility]   Please call Maria D at [phone] x [phone]    Need to get ICD9 and CPT codes regarding the surgery. Also need to know when the patient will return to work    Thank you

## 2019-05-10 ENCOUNTER — CLINICAL SUPPORT (OUTPATIENT)
Dept: REHABILITATION | Facility: HOSPITAL | Age: 44
End: 2019-05-10
Attending: ORTHOPAEDIC SURGERY
Payer: COMMERCIAL

## 2019-05-10 DIAGNOSIS — M25.60 RANGE OF MOTION DEFICIT: ICD-10-CM

## 2019-05-10 DIAGNOSIS — R53.1 WEAKNESS: ICD-10-CM

## 2019-05-10 PROCEDURE — 97110 THERAPEUTIC EXERCISES: CPT | Performed by: PHYSICAL THERAPIST

## 2019-05-10 PROCEDURE — 97140 MANUAL THERAPY 1/> REGIONS: CPT | Performed by: PHYSICAL THERAPIST

## 2019-05-10 NOTE — PROGRESS NOTES
Physical Therapy Daily Treatment Note     Name: Maria D Reid  [facility] Number: [id number]    Therapy Diagnosis:   Encounter Diagnoses   Name Primary?    Weakness     Range of motion deficit      Physician: Cha Stoll PA-C    Visit Date: 5/10/2019    Physician Orders: PT Eval and Treat   RIGHT shoulder arthroscopic rotator cuff repair, SAD  on 4/24/2019.      POSTOPERATIVE PLAN OF CARE:  -Patient will be discharged home according to protocol.  -Physical Therapy:  Will start therapy right away.  The patient will be at risk for stiffness given her concurrent pathology of both labral and cuff tearing.  Begin passive range of motion now.  No active range of motion for 6-7 weeks.  She will remain in her sling for 7-8 weeks.  No resistive activity for 12 weeks.    Per discussion with MD: Passive range in flexion and scaption, ER to 30 deg. Pendulums ok. AROM/AAROM at 6-7 weeks.    5/7/2019  Continue passive range of motion within the limits of the protocol.  No active range of motion for now.  May start that at 7 weeks.  No significant resistive activity for 12 weeks.  No biceps resistance activity for 8 weeks postop.  Must remain in her sling for a total of 7 8 weeks postop.  All questions answered.  Hold her out of work for another 4 weeks.        Medical Diagnosis from Referral: M75.111 (ICD-10-CM) - Incomplete tear of right rotator cuff  Surgery Date: 4/24/2019  - 6 weeks (AROM) = 6/10/2019  - 12 weeks (Resistance) = 7/22/2019  Evaluation Date: 4/29/2019  Authorization Period Expiration: 12/31/2019  Plan of Care Expiration: 8/19/2019  Visit # / Visits authorized: 6/ 20     Time In: 1300  Time Out: 1348  Total Billable Time: 48 minutes     Precautions: HTN      Subjective     Pt reports: Went out with friends last night. A little soreness but not too bad today. I took a muscle relaxer before coming today.   She was compliant with home exercise program.  Response to previous treatment: Sore  Functional change:  NA    Pain: 2/10  Location: right shoulder      Objective     Maria D received therapeutic exercises to develop ROM for 27 minutes including:  - AROM elbow flexion/extension 3'  - AROM pronation/supination 3'  - wrist extension/flexion 2# 3'  - wrist radial deviation/ulnar deviation 2# 3'  - Scapular retraction in chair   - Pendulums 4 ways 2' each    Maria D received the following manual therapy techniques: PROM were applied to the: (R) shoulder for 21 minutes, including:  - PROM flexion and scaption per protocol and pt tolerance.  - PROM ER to 20 degrees as tolerated  - Sidelying scapular framing   - GR I-II oscillations for pain relief    Maria D received hot pack for 0 minutes to (R) shoulder.    Maria D received cold pack for 0 minutes to (R) shoulder.      Home Exercises Provided and Patient Education Provided     Education provided:   - Reviewed HEP to continue at home    Written Home Exercises Provided: Patient instructed to cont prior HEP.   Exercises were reviewed and Maria D was able to demonstrate them prior to the end of the session.  Maria D demonstrated good  understanding of the education provided.     See EMR under Patient Instructions for exercises provided prior visit.    Assessment     Decreased muscular guarding with manual today attributed to muscle relaxer before therapy. Good control of scapula with retractions. ER to approximately 20 degrees today. FOTO limitation decreased to 68%    Maria D is progressing well towards her goals.   Pt prognosis is Good.     Pt will continue to benefit from skilled outpatient physical therapy to address the deficits listed in the problem list box on initial evaluation, provide pt/family education and to maximize pt's level of independence in the home and community environment.     Pt's spiritual, cultural and educational needs considered and pt agreeable to plan of care and goals.    Anticipated barriers to physical therapy: Increased risk for stiffness due to RTC  and labral pathology.       Goals:  Short Term Goals: 6 weeks   - Pt will demonstrate excellent knowledge and adherence to HEP to facilitate optimal recovery. (met)  - Pt will demonstrate full PROM per protocol to allow for progression into AROM needed for increased functional use of RUE. (progressing, not me)     Long Term Goals: 16 weeks   - Pt will report FOTO score of 34% limited or less indicating clinically relevant increase in functional tolerance. (progressing, not met)  - Pt will demonstrate full AROM of R shoulder to allow for increased functional activity tolernace (progressing, not met)  - Pt will demonstrate at least 4/5 MMT of R shoulder musculature for increased stability needed for functional activity (progressing, not met)       Plan   Plan of care Certification: 4/29/2019 to 8/19/2019.     Outpatient Physical Therapy 3 times weekly for first 2 weeks, decreasing if appropriate to twice weekly for total of for 16 weeks to include the following interventions: Electrical Stimulation PRN, Manual Therapy, Moist Heat/ Ice, Neuromuscular Re-ed, Patient Education, Therapeutic Activites and Therapeutic Exercise.      Per discussion with MD: Passive range in flexion and scaption, ER to 30 deg. Pendulums ok. AROM/AAROM at 6-7 weeks.    Continue passive range of motion within the limits of the protocol.  No active range of motion for now.  May start that at 7 weeks.  No significant resistive activity for 12 weeks.  No biceps resistance activity for 8 weeks postop.  Must remain in her sling for a total of 7 8 weeks postop.  All questions answered.  Hold her out of work for another 4 weeks.       Sulaiman Cooper, PT, DPT

## 2019-05-13 ENCOUNTER — CLINICAL SUPPORT (OUTPATIENT)
Dept: REHABILITATION | Facility: HOSPITAL | Age: 44
End: 2019-05-13
Payer: COMMERCIAL

## 2019-05-13 DIAGNOSIS — R53.1 WEAKNESS: ICD-10-CM

## 2019-05-13 DIAGNOSIS — M25.60 RANGE OF MOTION DEFICIT: ICD-10-CM

## 2019-05-13 PROCEDURE — 97110 THERAPEUTIC EXERCISES: CPT | Performed by: PHYSICAL THERAPIST

## 2019-05-13 PROCEDURE — 97140 MANUAL THERAPY 1/> REGIONS: CPT | Performed by: PHYSICAL THERAPIST

## 2019-05-13 NOTE — PROGRESS NOTES
Physical Therapy Daily Treatment Note     Name: Maria D Reid  [facility] Number: [id number]    Therapy Diagnosis:   Encounter Diagnoses   Name Primary?    Weakness     Range of motion deficit      Physician: Cha Stoll PA-C    Visit Date: 5/13/2019    Physician Orders: PT Eval and Treat   RIGHT shoulder arthroscopic rotator cuff repair, SAD  on 4/24/2019.      POSTOPERATIVE PLAN OF CARE:  -Patient will be discharged home according to protocol.  -Physical Therapy:  Will start therapy right away.  The patient will be at risk for stiffness given her concurrent pathology of both labral and cuff tearing.  Begin passive range of motion now.  No active range of motion for 6-7 weeks.  She will remain in her sling for 7-8 weeks.  No resistive activity for 12 weeks.    Per discussion with MD: Passive range in flexion and scaption, ER to 30 deg. Pendulums ok. AROM/AAROM at 6-7 weeks.    5/7/2019  Continue passive range of motion within the limits of the protocol.  No active range of motion for now.  May start that at 7 weeks.  No significant resistive activity for 12 weeks.  No biceps resistance activity for 8 weeks postop.  Must remain in her sling for a total of 7 8 weeks postop.  All questions answered.  Hold her out of work for another 4 weeks.        Medical Diagnosis from Referral: M75.111 (ICD-10-CM) - Incomplete tear of right rotator cuff  Surgery Date: 4/24/2019  - 6 weeks (AROM) = 6/10/2019  - 12 weeks (Resistance) = 7/22/2019  Evaluation Date: 4/29/2019  Authorization Period Expiration: 12/31/2019  Plan of Care Expiration: 8/19/2019  Visit # / Visits authorized: 7/ 20     Time In: 1300  Time Out: 1455  Total Billable Time: 55 minutes     Precautions: HTN      Subjective     Pt reports: Hurting this morning, unsure the cause. Went out with friends over the weekend  She was compliant with home exercise program.  Response to previous treatment: Sore  Functional change: NA    Pain: 2/10  Location: right  shoulder      Objective     Maria D received therapeutic exercises to develop ROM for 25 minutes including:  - AROM elbow flexion/extension 3'  - AROM pronation/supination 3'  - wrist extension/flexion 2# 3'  - wrist radial deviation/ulnar deviation 2# 3'  - Scapular retraction in chair   - Pendulums 4 ways 2' each    Maria D received the following manual therapy techniques: PROM were applied to the: (R) shoulder for 20 minutes, including:  - PROM flexion and scaption per protocol and pt tolerance.  - PROM ER to 20 degrees as tolerated  - Sidelying scapular framing   - GR I-II oscillations for pain relief    Maria D received hot pack for 0 minutes to (R) shoulder.    Maria D received cold pack for 10 minutes to (R) shoulde to decrease swelling and minimize pain.      Home Exercises Provided and Patient Education Provided     Education provided:   - Reviewed HEP to continue at home    Written Home Exercises Provided: Patient instructed to cont prior HEP.   Exercises were reviewed and Maria D was able to demonstrate them prior to the end of the session.  Maria D demonstrated good  understanding of the education provided.     See EMR under Patient Instructions for exercises provided prior visit.    Assessment     Maria D had increased discomfort in the shoulder after a long weekend. She continued to struggle with sleeping at night. She tolerated ER close to her 30 deg allowed per protocol. Will continue to progress within protocol guidelines    Maria D is progressing well towards her goals.   Pt prognosis is Good.     Pt will continue to benefit from skilled outpatient physical therapy to address the deficits listed in the problem list box on initial evaluation, provide pt/family education and to maximize pt's level of independence in the home and community environment.     Pt's spiritual, cultural and educational needs considered and pt agreeable to plan of care and goals.    Anticipated barriers to physical therapy:  Increased risk for stiffness due to RTC and labral pathology.       Goals:  Short Term Goals: 6 weeks   - Pt will demonstrate excellent knowledge and adherence to HEP to facilitate optimal recovery. (met)  - Pt will demonstrate full PROM per protocol to allow for progression into AROM needed for increased functional use of RUE. (progressing, not me)     Long Term Goals: 16 weeks   - Pt will report FOTO score of 34% limited or less indicating clinically relevant increase in functional tolerance. (progressing, not met)  - Pt will demonstrate full AROM of R shoulder to allow for increased functional activity tolernace (progressing, not met)  - Pt will demonstrate at least 4/5 MMT of R shoulder musculature for increased stability needed for functional activity (progressing, not met)       Plan   Plan of care Certification: 4/29/2019 to 8/19/2019.     Outpatient Physical Therapy 3 times weekly for first 2 weeks, decreasing if appropriate to twice weekly for total of for 16 weeks to include the following interventions: Electrical Stimulation PRN, Manual Therapy, Moist Heat/ Ice, Neuromuscular Re-ed, Patient Education, Therapeutic Activites and Therapeutic Exercise.      Per discussion with MD: Passive range in flexion and scaption, ER to 30 deg. Pendulums ok. AROM/AAROM at 6-7 weeks.    Continue passive range of motion within the limits of the protocol.  No active range of motion for now.  May start that at 7 weeks.  No significant resistive activity for 12 weeks.  No biceps resistance activity for 8 weeks postop.  Must remain in her sling for a total of 7 8 weeks postop.  All questions answered.  Hold her out of work for another 4 weeks.       Sulaiman Cooper, PT, DPT

## 2019-05-15 ENCOUNTER — CLINICAL SUPPORT (OUTPATIENT)
Dept: REHABILITATION | Facility: HOSPITAL | Age: 44
End: 2019-05-15
Payer: COMMERCIAL

## 2019-05-15 DIAGNOSIS — M25.60 RANGE OF MOTION DEFICIT: ICD-10-CM

## 2019-05-15 DIAGNOSIS — R53.1 WEAKNESS: ICD-10-CM

## 2019-05-15 PROCEDURE — 97110 THERAPEUTIC EXERCISES: CPT | Performed by: PHYSICAL THERAPIST

## 2019-05-15 PROCEDURE — 97140 MANUAL THERAPY 1/> REGIONS: CPT | Performed by: PHYSICAL THERAPIST

## 2019-05-15 NOTE — PROGRESS NOTES
Physical Therapy Daily Treatment Note     Name: Mraia D Reid  [facility] Number: [id number]    Therapy Diagnosis:   Encounter Diagnoses   Name Primary?    Weakness     Range of motion deficit      Physician: Cha Stoll PA-C    Visit Date: 5/15/2019    Physician Orders: PT Eval and Treat   RIGHT shoulder arthroscopic rotator cuff repair, SAD  on 4/24/2019.      POSTOPERATIVE PLAN OF CARE:  -Patient will be discharged home according to protocol.  -Physical Therapy:  Will start therapy right away.  The patient will be at risk for stiffness given her concurrent pathology of both labral and cuff tearing.  Begin passive range of motion now.  No active range of motion for 6-7 weeks.  She will remain in her sling for 7-8 weeks.  No resistive activity for 12 weeks.    Per discussion with MD: Passive range in flexion and scaption, ER to 30 deg. Pendulums ok. AROM/AAROM at 6-7 weeks.    5/7/2019  Continue passive range of motion within the limits of the protocol.  No active range of motion for now.  May start that at 7 weeks.  No significant resistive activity for 12 weeks.  No biceps resistance activity for 8 weeks postop.  Must remain in her sling for a total of 7 8 weeks postop.  All questions answered.  Hold her out of work for another 4 weeks.        Medical Diagnosis from Referral: M75.111 (ICD-10-CM) - Incomplete tear of right rotator cuff  Surgery Date: 4/24/2019  - 6 weeks (AROM) = 6/10/2019  - 12 weeks (Resistance) = 7/22/2019  Evaluation Date: 4/29/2019  Authorization Period Expiration: 12/31/2019  Plan of Care Expiration: 8/19/2019  Visit # / Visits authorized: 8/ 20     Time In: 1300  Time Out: 1340  Total Billable Time: 40 minutes     Precautions: HTN      Subjective     Pt reports: Pain is better today. Sleeping still difficult but pain in shoulder is minimal  She was compliant with home exercise program.  Response to previous treatment: Sore  Functional change: NA    Pain: 1/10  Location: right  shoulder      Objective     Maria D received therapeutic exercises to develop ROM for 20 minutes including:  - AROM elbow flexion/extension 3'  - AROM pronation/supination 3'  - wrist extension/flexion 2# 3'  - wrist radial deviation/ulnar deviation 2# 3'  - Scapular retraction in chair   - Pendulums 4 ways 2' each    Maria D received the following manual therapy techniques: PROM were applied to the: (R) shoulder for 20 minutes, including:  - PROM flexion and scaption per protocol and pt tolerance.  - PROM ER to 20 degrees as tolerated  - Sidelying scapular framing   - GR I-II oscillations for pain relief    Maria D received hot pack for 0 minutes to (R) shoulder.    Maria D received cold pack for 10 minutes to (R) shoulde to decrease swelling and minimize pain.      Home Exercises Provided and Patient Education Provided     Education provided:   - Reviewed HEP to continue at home    Written Home Exercises Provided: Patient instructed to cont prior HEP.   Exercises were reviewed and Maria D was able to demonstrate them prior to the end of the session.  Maria D demonstrated good  understanding of the education provided.     See EMR under Patient Instructions for exercises provided prior visit.    Assessment     Maria D tolerated increase to 3# weight today very well. Shoulder PROM continues to improve and she is compliant with precautions. Scapular framing focus on inferior glide and retraction with relief, decreased discomfort over the AC.     Maria D is progressing well towards her goals.   Pt prognosis is Good.     Pt will continue to benefit from skilled outpatient physical therapy to address the deficits listed in the problem list box on initial evaluation, provide pt/family education and to maximize pt's level of independence in the home and community environment.     Pt's spiritual, cultural and educational needs considered and pt agreeable to plan of care and goals.    Anticipated barriers to physical therapy:  Increased risk for stiffness due to RTC and labral pathology.       Goals:  Short Term Goals: 6 weeks   - Pt will demonstrate excellent knowledge and adherence to HEP to facilitate optimal recovery. (met)  - Pt will demonstrate full PROM per protocol to allow for progression into AROM needed for increased functional use of RUE. (progressing, not me)     Long Term Goals: 16 weeks   - Pt will report FOTO score of 34% limited or less indicating clinically relevant increase in functional tolerance. (progressing, not met)  - Pt will demonstrate full AROM of R shoulder to allow for increased functional activity tolernace (progressing, not met)  - Pt will demonstrate at least 4/5 MMT of R shoulder musculature for increased stability needed for functional activity (progressing, not met)       Plan   Plan of care Certification: 4/29/2019 to 8/19/2019.     Outpatient Physical Therapy 3 times weekly for first 2 weeks, decreasing if appropriate to twice weekly for total of for 16 weeks to include the following interventions: Electrical Stimulation PRN, Manual Therapy, Moist Heat/ Ice, Neuromuscular Re-ed, Patient Education, Therapeutic Activites and Therapeutic Exercise.      Per discussion with MD: Passive range in flexion and scaption, ER to 30 deg. Pendulums ok. AROM/AAROM at 6-7 weeks.    Continue passive range of motion within the limits of the protocol.  No active range of motion for now.  May start that at 7 weeks.  No significant resistive activity for 12 weeks.  No biceps resistance activity for 8 weeks postop.  Must remain in her sling for a total of 7 8 weeks postop.  All questions answered.  Hold her out of work for another 4 weeks.       Sulaiman Cooper, PT, DPT

## 2019-05-17 ENCOUNTER — CLINICAL SUPPORT (OUTPATIENT)
Dept: REHABILITATION | Facility: HOSPITAL | Age: 44
End: 2019-05-17
Payer: COMMERCIAL

## 2019-05-17 DIAGNOSIS — M25.60 RANGE OF MOTION DEFICIT: ICD-10-CM

## 2019-05-17 DIAGNOSIS — R53.1 WEAKNESS: ICD-10-CM

## 2019-05-17 PROCEDURE — 97110 THERAPEUTIC EXERCISES: CPT

## 2019-05-17 PROCEDURE — 97140 MANUAL THERAPY 1/> REGIONS: CPT

## 2019-05-17 NOTE — PROGRESS NOTES
"  Physical Therapy Daily Treatment Note     Name: Maria D Reid  [facility] Number: [id number]    Therapy Diagnosis:   Encounter Diagnoses   Name Primary?    Weakness     Range of motion deficit      Physician: Cha Stoll PA-C    Visit Date: 5/17/2019    Physician Orders: PT Eval and Treat   RIGHT shoulder arthroscopic rotator cuff repair, SAD  on 4/24/2019.      POSTOPERATIVE PLAN OF CARE:  -Patient will be discharged home according to protocol.  -Physical Therapy:  Will start therapy right away.  The patient will be at risk for stiffness given her concurrent pathology of both labral and cuff tearing.  Begin passive range of motion now.  No active range of motion for 6-7 weeks.  She will remain in her sling for 7-8 weeks.  No resistive activity for 12 weeks.    Per discussion with MD: Passive range in flexion and scaption, ER to 30 deg. Pendulums ok. AROM/AAROM at 6-7 weeks.    5/7/2019  Continue passive range of motion within the limits of the protocol.  No active range of motion for now.  May start that at 7 weeks.  No significant resistive activity for 12 weeks.  No biceps resistance activity for 8 weeks postop.  Must remain in her sling for a total of 7 8 weeks postop.  All questions answered.  Hold her out of work for another 4 weeks.        Medical Diagnosis from Referral: M75.111 (ICD-10-CM) - Incomplete tear of right rotator cuff  Surgery Date: 4/24/2019  - 6 weeks (AROM) = 6/10/2019  - 12 weeks (Resistance) = 7/22/2019  Evaluation Date: 4/29/2019  Authorization Period Expiration: 12/31/2019  Plan of Care Expiration: 8/19/2019  Visit # / Visits authorized: 9/ 20     Time In: 1:20  Time Out: 2:15  Total Billable Time: 30 minutes     Precautions: HTN      Subjective     Pt reports: Pain is better today. She still has days where it is "achey" but she's been doing ok.   She was compliant with home exercise program.  Response to previous treatment: Sore  Functional change: Decreasing pain    Pain: Patient " unable to verbalize number  Location: right shoulder      Objective     Maria D received therapeutic exercises to develop ROM for 15 minutes including:  - AROM elbow flexion/extension 3'  - AROM pronation/supination 3'  - wrist extension/flexion 3# 3'  - wrist radial deviation/ulnar deviation 3# 3'  - Scapular retraction in chair   - Pendulums 4 ways 2' each    Maria D received the following manual therapy techniques: PROM were applied to the: (R) shoulder for 15 minutes, including:  - PROM flexion and scaption per protocol and pt tolerance.  - PROM ER to 20 degrees as tolerated  - Sidelying scapular framing   - GR I-II oscillations for pain relief    Maria D received hot pack for 0 minutes to (R) shoulder.    Maria D received cold pack for 10 minutes to (R) shoulde to decrease swelling and minimize pain.      Home Exercises Provided and Patient Education Provided     Education provided:   - Reviewed HEP to continue at home    Written Home Exercises Provided: Patient instructed to cont prior HEP.   Exercises were reviewed and Maria D was able to demonstrate them prior to the end of the session.  Maria D demonstrated good  understanding of the education provided.     See EMR under Patient Instructions for exercises provided prior visit.    Assessment     Maria D tolerated increase to 3# weight today very well. Shoulder PROM continues to improve and she is compliant with precautions. Decreasing muscle guarding noted overall. Patient reported feeling well upon departure.      Maria D is progressing well towards her goals.   Pt prognosis is Good.     Pt will continue to benefit from skilled outpatient physical therapy to address the deficits listed in the problem list box on initial evaluation, provide pt/family education and to maximize pt's level of independence in the home and community environment.     Pt's spiritual, cultural and educational needs considered and pt agreeable to plan of care and goals.    Anticipated  barriers to physical therapy: Increased risk for stiffness due to RTC and labral pathology.       Goals:  Short Term Goals: 6 weeks   - Pt will demonstrate excellent knowledge and adherence to HEP to facilitate optimal recovery. (ACHIEVED)  - Pt will demonstrate full PROM per protocol to allow for progression into AROM needed for increased functional use of RUE. (progressing, not me)     Long Term Goals: 16 weeks   - Pt will report FOTO score of 34% limited or less indicating clinically relevant increase in functional tolerance. (progressing, not met)  - Pt will demonstrate full AROM of R shoulder to allow for increased functional activity tolernace (progressing, not met)  - Pt will demonstrate at least 4/5 MMT of R shoulder musculature for increased stability needed for functional activity (progressing, not met)       Plan   Plan of care Certification: 4/29/2019 to 8/19/2019.     Outpatient Physical Therapy 3 times weekly for first 2 weeks, decreasing if appropriate to twice weekly for total of for 16 weeks to include the following interventions: Electrical Stimulation PRN, Manual Therapy, Moist Heat/ Ice, Neuromuscular Re-ed, Patient Education, Therapeutic Activites and Therapeutic Exercise.      Per discussion with MD: Passive range in flexion and scaption, ER to 30 deg. Pendulums ok. AROM/AAROM at 6-7 weeks.    Continue passive range of motion within the limits of the protocol.  No active range of motion for now.  May start that at 7 weeks.  No significant resistive activity for 12 weeks.  No biceps resistance activity for 8 weeks postop.  Must remain in her sling for a total of 7 8 weeks postop.  All questions answered.  Hold her out of work for another 4 weeks.       Cony Miller, PT, DPT

## 2019-05-20 ENCOUNTER — CLINICAL SUPPORT (OUTPATIENT)
Dept: REHABILITATION | Facility: HOSPITAL | Age: 44
End: 2019-05-20
Payer: COMMERCIAL

## 2019-05-20 DIAGNOSIS — M25.60 RANGE OF MOTION DEFICIT: ICD-10-CM

## 2019-05-20 DIAGNOSIS — R53.1 WEAKNESS: ICD-10-CM

## 2019-05-20 PROCEDURE — 97140 MANUAL THERAPY 1/> REGIONS: CPT

## 2019-05-20 PROCEDURE — 97110 THERAPEUTIC EXERCISES: CPT

## 2019-05-23 ENCOUNTER — CLINICAL SUPPORT (OUTPATIENT)
Dept: REHABILITATION | Facility: HOSPITAL | Age: 44
End: 2019-05-23
Payer: COMMERCIAL

## 2019-05-23 DIAGNOSIS — M25.60 RANGE OF MOTION DEFICIT: ICD-10-CM

## 2019-05-23 DIAGNOSIS — R53.1 WEAKNESS: ICD-10-CM

## 2019-05-23 PROCEDURE — 97110 THERAPEUTIC EXERCISES: CPT

## 2019-05-23 PROCEDURE — 97140 MANUAL THERAPY 1/> REGIONS: CPT

## 2019-05-23 NOTE — PROGRESS NOTES
"  Physical Therapy Daily Treatment Note     Name: Maria D Reid  [facility] Number: [id number]    Therapy Diagnosis:   Encounter Diagnoses   Name Primary?    Weakness     Range of motion deficit      Physician: Cha Stoll PA-C    Visit Date: 5/23/2019    Physician Orders: PT Eval and Treat   RIGHT shoulder arthroscopic rotator cuff repair, SAD  on 4/24/2019.      POSTOPERATIVE PLAN OF CARE:  -Patient will be discharged home according to protocol.  -Physical Therapy:  Will start therapy right away.  The patient will be at risk for stiffness given her concurrent pathology of both labral and cuff tearing.  Begin passive range of motion now.  No active range of motion for 6-7 weeks.  She will remain in her sling for 7-8 weeks.  No resistive activity for 12 weeks.    Per discussion with MD: Passive range in flexion and scaption, ER to 30 deg. Pendulums ok. AROM/AAROM at 6-7 weeks.    5/7/2019  Continue passive range of motion within the limits of the protocol.  No active range of motion for now.  May start that at 7 weeks.  No significant resistive activity for 12 weeks.  No biceps resistance activity for 8 weeks postop.  Must remain in her sling for a total of 7 8 weeks postop.  All questions answered.  Hold her out of work for another 4 weeks.        Medical Diagnosis from Referral: M75.111 (ICD-10-CM) - Incomplete tear of right rotator cuff  Surgery Date: 4/24/2019  - 6 weeks (AROM) = 6/10/2019  - 12 weeks (Resistance) = 7/22/2019  Evaluation Date: 4/29/2019  Authorization Period Expiration: 12/31/2019  Plan of Care Expiration: 8/19/2019  Visit # / Visits authorized: 11/ 20     Time In: 1:00  Time Out: 2:15  Total Billable Time: 38 minutes     Precautions: HTN      Subjective     Pt reports: Pain is described as being "achey" but overall she's "okay."   She was compliant with home exercise program.  Response to previous treatment: Sore  Functional change: Decreasing pain    Pain: Patient unable to verbalize " number  Location: right shoulder      Objective       Maria D received therapeutic exercises to develop ROM for 23 minutes including:  - AROM elbow flexion/extension 3'  - AROM pronation/supination 3'  - wrist extension/flexion 3# 3'  - wrist radial deviation/ulnar deviation 3# 3'  - Scapular retraction in chair   - Pendulums 4 ways 2' each    Maria D received the following manual therapy techniques: PROM were applied to the: (R) shoulder for 15 minutes, including:  - PROM flexion and scaption per protocol and pt tolerance.  - PROM ER to 20 degrees as tolerated  - Sidelying scapular framing   - GR I-II oscillations for pain relief    Maria D received hot pack for 10 minutes to (R) shoulder.    Maria D received cold pack for 10 minutes to (R) shoulde to decrease swelling and minimize pain.      Home Exercises Provided and Patient Education Provided     Education provided:   - Reviewed HEP to continue at home    Written Home Exercises Provided: Patient instructed to cont prior HEP.   Exercises were reviewed and Maria D was able to demonstrate them prior to the end of the session.  Maria D demonstrated good  understanding of the education provided.     See EMR under Patient Instructions for exercises provided prior visit.    Assessment     Decreasing levels of discomfort noted at end-ranges of GH PROM. Pt is progressing appropriately for this stage of recovery. Reassessment to be performed next session.     Maria D is progressing well towards her goals.   Pt prognosis is Good.     Pt will continue to benefit from skilled outpatient physical therapy to address the deficits listed in the problem list box on initial evaluation, provide pt/family education and to maximize pt's level of independence in the home and community environment.     Pt's spiritual, cultural and educational needs considered and pt agreeable to plan of care and goals.    Anticipated barriers to physical therapy: Increased risk for stiffness due to RTC and  labral pathology.       Goals:  Short Term Goals: 6 weeks   - Pt will demonstrate excellent knowledge and adherence to HEP to facilitate optimal recovery. (ACHIEVED)  - Pt will demonstrate full PROM per protocol to allow for progression into AROM needed for increased functional use of RUE. (progressing, not me)     Long Term Goals: 16 weeks   - Pt will report FOTO score of 34% limited or less indicating clinically relevant increase in functional tolerance. (progressing, not met)  - Pt will demonstrate full AROM of R shoulder to allow for increased functional activity tolernace (progressing, not met)  - Pt will demonstrate at least 4/5 MMT of R shoulder musculature for increased stability needed for functional activity (progressing, not met)       Plan     Per discussion with MD: Passive range in flexion and scaption, ER to 30 deg. Pendulums ok. AROM/AAROM at 6-7 weeks.    Continue passive range of motion within the limits of the protocol.  No active range of motion for now.  May start that at 7 weeks.  No significant resistive activity for 12 weeks.  No biceps resistance activity for 8 weeks postop.  Must remain in her sling for a total of 7 8 weeks postop.  All questions answered.  Hold her out of work for another 4 weeks.       Continue per protocol to improve mobility. Reassess PROM next session.     Cony Miller, PT, DPT

## 2019-05-24 ENCOUNTER — CLINICAL SUPPORT (OUTPATIENT)
Dept: REHABILITATION | Facility: HOSPITAL | Age: 44
End: 2019-05-24
Payer: COMMERCIAL

## 2019-05-24 DIAGNOSIS — M25.60 RANGE OF MOTION DEFICIT: ICD-10-CM

## 2019-05-24 DIAGNOSIS — R53.1 WEAKNESS: ICD-10-CM

## 2019-05-24 PROCEDURE — 97110 THERAPEUTIC EXERCISES: CPT

## 2019-05-24 PROCEDURE — 97140 MANUAL THERAPY 1/> REGIONS: CPT

## 2019-05-24 NOTE — PROGRESS NOTES
Physical Therapy Daily Treatment Note     Name: Maria D Reid  [facility] Number: [id number]    Therapy Diagnosis:   Encounter Diagnoses   Name Primary?    Weakness     Range of motion deficit      Physician: Cha Stoll PA-C    Visit Date: 5/24/2019    Physician Orders: PT Eval and Treat   RIGHT shoulder arthroscopic rotator cuff repair, SAD  on 4/24/2019.      POSTOPERATIVE PLAN OF CARE:  -Patient will be discharged home according to protocol.  -Physical Therapy:  Will start therapy right away.  The patient will be at risk for stiffness given her concurrent pathology of both labral and cuff tearing.  Begin passive range of motion now.  No active range of motion for 6-7 weeks.  She will remain in her sling for 7-8 weeks.  No resistive activity for 12 weeks.    Per discussion with MD: Passive range in flexion and scaption, ER to 30 deg. Pendulums ok. AROM/AAROM at 6-7 weeks.    5/7/2019  Continue passive range of motion within the limits of the protocol.  No active range of motion for now.  May start that at 7 weeks.  No significant resistive activity for 12 weeks.  No biceps resistance activity for 8 weeks postop.  Must remain in her sling for a total of 7 8 weeks postop.  All questions answered.  Hold her out of work for another 4 weeks.        Medical Diagnosis from Referral: M75.111 (ICD-10-CM) - Incomplete tear of right rotator cuff  Surgery Date: 4/24/2019  - 6 weeks (AROM) = 6/10/2019  - 12 weeks (Resistance) = 7/22/2019  Evaluation Date: 4/29/2019  Authorization Period Expiration: 12/31/2019  Plan of Care Expiration: 8/19/2019  Visit # / Visits authorized: 12/ 20     Time In: 11:00  Time Out: 12:00  Total Billable Time: 60 minutes     Precautions: HTN      Subjective     Pt reports: Her shoulder is hurting some today because she's been packing to head out of town.   She was compliant with home exercise program.  Response to previous treatment: Sore  Functional change: Decreasing pain    Pain: Patient  unable to verbalize number  Location: right shoulder      Objective       UPDATE - (R) shoulder PROM:     Flexion: 140 deg  Scaption: 145 deg  ER (in scaption plane): 30 deg     TREATMENT:   Maria D received therapeutic exercises to develop ROM for 23 minutes including:  - AROM elbow flexion/extension 3'  - AROM pronation/supination 3'  - wrist extension/flexion 3# 3'  - wrist radial deviation/ulnar deviation 3# 3'  - Scapular retraction in chair   - Pendulums 4 ways 2' each    Maria D received the following manual therapy techniques: PROM were applied to the: (R) shoulder for 23 minutes, including:  - PROM flexion and scaption per protocol and pt tolerance.  - PROM ER to 30 degrees in scaption plane as tolerated  - Sidelying scapular framing   - GR I-II oscillations for pain relief    Maria D received hot pack for 5 minutes to (R) shoulder.    Maria D received cold pack for 10 minutes to (R) shoulde to decrease swelling and minimize pain.      Home Exercises Provided and Patient Education Provided     Education provided:   - Reviewed HEP to continue at home    Written Home Exercises Provided: Patient instructed to cont prior HEP.   Exercises were reviewed and Maria D was able to demonstrate them prior to the end of the session.  Maria D demonstrated good  understanding of the education provided.     See EMR under Patient Instructions for exercises provided prior visit.    Assessment     Reassessment of PROM performed with excellent progress noted for current stage of healing. Normal end-feel noted at end-ROM in all planes. Further skilled intervention required to further progress towards functional goals and facilitate optimal recovery from surgery.        Maria D is progressing well towards her goals.   Pt prognosis is Good.     Pt will continue to benefit from skilled outpatient physical therapy to address the deficits listed in the problem list box on initial evaluation, provide pt/family education and to maximize pt's  level of independence in the home and community environment.     Pt's spiritual, cultural and educational needs considered and pt agreeable to plan of care and goals.    Anticipated barriers to physical therapy: Increased risk for stiffness due to RTC and labral pathology.       Goals:  Short Term Goals: 6 weeks   - Pt will demonstrate excellent knowledge and adherence to HEP to facilitate optimal recovery. (ACHIEVED)  - Pt will demonstrate full PROM per protocol to allow for progression into AROM needed for increased functional use of RUE. (progressing, not me)     Long Term Goals: 16 weeks   - Pt will report FOTO score of 34% limited or less indicating clinically relevant increase in functional tolerance. (progressing, not met)  - Pt will demonstrate full AROM of R shoulder to allow for increased functional activity tolernace (progressing, not met)  - Pt will demonstrate at least 4/5 MMT of R shoulder musculature for increased stability needed for functional activity (progressing, not met)       Plan     Per discussion with MD: Passive range in flexion and scaption, ER to 30 deg. Pendulums ok. AROM/AAROM at 6-7 weeks.    Continue passive range of motion within the limits of the protocol.  No active range of motion for now.  May start that at 7 weeks.  No significant resistive activity for 12 weeks.  No biceps resistance activity for 8 weeks postop.  Must remain in her sling for a total of 7 8 weeks postop.  All questions answered.  Hold her out of work for another 4 weeks.       Continue per protocol to improve mobility.     Cony Miller, PT, DPT

## 2019-06-03 ENCOUNTER — CLINICAL SUPPORT (OUTPATIENT)
Dept: REHABILITATION | Facility: HOSPITAL | Age: 44
End: 2019-06-03
Attending: ORTHOPAEDIC SURGERY
Payer: COMMERCIAL

## 2019-06-03 DIAGNOSIS — M25.60 RANGE OF MOTION DEFICIT: ICD-10-CM

## 2019-06-03 DIAGNOSIS — Z98.890 S/P ARTHROSCOPY OF RIGHT SHOULDER: Primary | ICD-10-CM

## 2019-06-03 DIAGNOSIS — R53.1 WEAKNESS: ICD-10-CM

## 2019-06-03 PROCEDURE — 97110 THERAPEUTIC EXERCISES: CPT

## 2019-06-03 PROCEDURE — 97140 MANUAL THERAPY 1/> REGIONS: CPT

## 2019-06-03 RX ORDER — CHLORZOXAZONE 500 MG/1
TABLET ORAL
Qty: 60 TABLET | Refills: 0 | Status: SHIPPED | OUTPATIENT
Start: 2019-06-03

## 2019-06-03 NOTE — PROGRESS NOTES
Physical Therapy Daily Treatment Note     Name: Maria D Reid  [facility] Number: [id number]    Therapy Diagnosis:   Encounter Diagnoses   Name Primary?    Weakness     Range of motion deficit      Physician: Cha Stoll PA-C    Visit Date: 6/3/2019    Physician Orders: PT Eval and Treat   RIGHT shoulder arthroscopic rotator cuff repair, SAD  on 4/24/2019.      POSTOPERATIVE PLAN OF CARE:  -Patient will be discharged home according to protocol.  -Physical Therapy:  Will start therapy right away.  The patient will be at risk for stiffness given her concurrent pathology of both labral and cuff tearing.  Begin passive range of motion now.  No active range of motion for 6-7 weeks.  She will remain in her sling for 7-8 weeks.  No resistive activity for 12 weeks.    Per discussion with MD: Passive range in flexion and scaption, ER to 30 deg. Pendulums ok. AROM/AAROM at 6-7 weeks.    5/7/2019  Continue passive range of motion within the limits of the protocol.  No active range of motion for now.  May start that at 7 weeks.  No significant resistive activity for 12 weeks.  No biceps resistance activity for 8 weeks postop.  Must remain in her sling for a total of 7 8 weeks postop.  All questions answered.  Hold her out of work for another 4 weeks.        Medical Diagnosis from Referral: M75.111 (ICD-10-CM) - Incomplete tear of right rotator cuff  Surgery Date: 4/24/2019  - 6 weeks (AROM) = 6/10/2019  - 12 weeks (Resistance) = 7/22/2019  Evaluation Date: 4/29/2019  Authorization Period Expiration: 12/31/2019  Plan of Care Expiration: 8/19/2019  Visit # / Visits authorized: 13/ 20     Time In: 1:00  Time Out: 2:00  Total Billable Time: 60 minutes     Precautions: HTN      Subjective     Pt reports: she is feeling well today. She just got back from a trip to [place]; she wasn't able to do everything she wanted to do, but she didn't have any issues/pain in her shoulder.   She was compliant with home exercise  program.  Response to previous treatment: Sore  Functional change: Decreasing pain    Pain: Patient unable to verbalize number  Location: right shoulder      Objective       UPDATE - (R) shoulder PROM:     Flexion: 140 deg  Scaption: 145 deg  ER (in scaption plane): 30 deg     TREATMENT:   Maria D received therapeutic exercises to develop ROM for 30 minutes including:  - AROM elbow flexion/extension 3'  - AROM pronation/supination 3'  - wrist extension/flexion 3# 3'  - wrist radial deviation/ulnar deviation 3# 3'  - Scapular retraction in chair   - Pendulums 4 ways 2' each      Maria D received the following manual therapy techniques: PROM were applied to the: (R) shoulder for 23 minutes, including:  - PROM flexion and scaption per protocol and pt tolerance.  - PROM ER to 30 degrees in scaption plane as tolerated  - Sidelying scapular framing   - GR I-II oscillations for pain relief    Maria D received hot pack for 5 minutes to (R) shoulder.    Maria D received cold pack for 10 minutes to (R) shoulde to decrease swelling and minimize pain.      Home Exercises Provided and Patient Education Provided     Education provided:   - Reviewed HEP to continue at home    Written Home Exercises Provided: Patient instructed to cont prior HEP.   Exercises were reviewed and Maria D was able to demonstrate them prior to the end of the session.  Maria D demonstrated good  understanding of the education provided.     See EMR under Patient Instructions for exercises provided prior visit.    Assessment     Patient continues to demonstrate excellent progress towards PROM for current stage of healing. Normal end-feel noted at end-ROM in all planes. Further skilled intervention required to further progress towards functional goals and facilitate optimal recovery from surgery.        Maria D is progressing well towards her goals.   Pt prognosis is Good.     Pt will continue to benefit from skilled outpatient physical therapy to address the  deficits listed in the problem list box on initial evaluation, provide pt/family education and to maximize pt's level of independence in the home and community environment.     Pt's spiritual, cultural and educational needs considered and pt agreeable to plan of care and goals.    Anticipated barriers to physical therapy: Increased risk for stiffness due to RTC and labral pathology.       Goals:  Short Term Goals: 6 weeks   - Pt will demonstrate excellent knowledge and adherence to HEP to facilitate optimal recovery. (ACHIEVED)  - Pt will demonstrate full PROM per protocol to allow for progression into AROM needed for increased functional use of RUE. (progressing, not me)     Long Term Goals: 16 weeks   - Pt will report FOTO score of 34% limited or less indicating clinically relevant increase in functional tolerance. (progressing, not met)  - Pt will demonstrate full AROM of R shoulder to allow for increased functional activity tolernace (progressing, not met)  - Pt will demonstrate at least 4/5 MMT of R shoulder musculature for increased stability needed for functional activity (progressing, not met)       Plan     Per discussion with MD: Passive range in flexion and scaption, ER to 30 deg. Pendulums ok. AROM/AAROM at 6-7 weeks.    Continue passive range of motion within the limits of the protocol.  No active range of motion for now.  May start that at 7 weeks.  No significant resistive activity for 12 weeks.  No biceps resistance activity for 8 weeks postop.  Must remain in her sling for a total of 7 8 weeks postop.  All questions answered.  Hold her out of work for another 4 weeks.       Continue per protocol to improve mobility.     Cony Miller, PT, DPT

## 2019-06-04 ENCOUNTER — TELEPHONE (OUTPATIENT)
Dept: SPORTS MEDICINE | Facility: CLINIC | Age: 44
End: 2019-06-04

## 2019-06-04 NOTE — TELEPHONE ENCOUNTER
lvm to advised patient her medication refill was done and she can call the pharmacy to pick it up.

## 2019-06-05 ENCOUNTER — CLINICAL SUPPORT (OUTPATIENT)
Dept: REHABILITATION | Facility: HOSPITAL | Age: 44
End: 2019-06-05
Payer: COMMERCIAL

## 2019-06-05 DIAGNOSIS — R53.1 WEAKNESS: ICD-10-CM

## 2019-06-05 DIAGNOSIS — M25.60 RANGE OF MOTION DEFICIT: ICD-10-CM

## 2019-06-05 PROCEDURE — 97140 MANUAL THERAPY 1/> REGIONS: CPT | Performed by: PHYSICAL THERAPIST

## 2019-06-05 PROCEDURE — 97110 THERAPEUTIC EXERCISES: CPT | Performed by: PHYSICAL THERAPIST

## 2019-06-05 NOTE — PROGRESS NOTES
Physical Therapy Daily Treatment Note     Name: Maria D Reid  [facility] Number: [id number]    Therapy Diagnosis:   Encounter Diagnoses   Name Primary?    Weakness     Range of motion deficit      Physician: Cha Stoll PA-C    Visit Date: 6/5/2019    Physician Orders: PT Eval and Treat   RIGHT shoulder arthroscopic rotator cuff repair, SAD  on 4/24/2019.      POSTOPERATIVE PLAN OF CARE:  -Patient will be discharged home according to protocol.  -Physical Therapy:  Will start therapy right away.  The patient will be at risk for stiffness given her concurrent pathology of both labral and cuff tearing.  Begin passive range of motion now.  No active range of motion for 6-7 weeks.  She will remain in her sling for 7-8 weeks.  No resistive activity for 12 weeks.    Per discussion with MD: Passive range in flexion and scaption, ER to 30 deg. Pendulums ok. AROM/AAROM at 6-7 weeks.    5/7/2019  Continue passive range of motion within the limits of the protocol.  No active range of motion for now.  May start that at 7 weeks.  No significant resistive activity for 12 weeks.  No biceps resistance activity for 8 weeks postop.  Must remain in her sling for a total of 7 8 weeks postop.  All questions answered.  Hold her out of work for another 4 weeks.        Medical Diagnosis from Referral: M75.111 (ICD-10-CM) - Incomplete tear of right rotator cuff  Surgery Date: 4/24/2019  - 6 weeks (AROM) = 6/10/2019  - 12 weeks (Resistance) = 7/22/2019  Evaluation Date: 4/29/2019  Authorization Period Expiration: 12/31/2019  Plan of Care Expiration: 8/19/2019  Visit # / Visits authorized: 13/ 20     Time In: 1300  Time Out: 1406  Total Billable Time: 56 minutes     Precautions: HTN      Subjective     Pt reports: Shoulder is feeling pretty well. Excited to see doctor, hoping to get out of my sling. Stomach ache last night so I actually slept better.  She was compliant with home exercise program.  Response to previous treatment:  Sore  Functional change: Decreasing pain    Pain: Patient unable to verbalize number  Location: right shoulder      Objective       UPDATE - (R) shoulder PROM:     Flexion: 140 deg  Scaption: 145 deg  ER (in scaption plane): 30 deg     TREATMENT:   Maria D received therapeutic exercises to develop ROM for 23 minutes including:  - AROM elbow flexion/extension 3'  - AROM pronation/supination 3'  - wrist extension/flexion 3# 3'  - wrist radial deviation/ulnar deviation 3# 3'  - Scapular retraction in chair   - Pendulums 4 ways 2' each    Maria D received the following manual therapy techniques: PROM were applied to the: (R) shoulder for 23 minutes, including:  - PROM flexion and scaption per protocol and pt tolerance.  - PROM ER to 30 degrees in scaption plane as tolerated  - Sidelying scapular framing   - GR I-II oscillations for pain relief    Maria D received hot pack for 0 minutes to (R) shoulder.    Maria D received cold pack for 10 minutes to (R) shoulde to decrease swelling and minimize pain.      Home Exercises Provided and Patient Education Provided     Education provided:   - Reviewed HEP to continue at home    Written Home Exercises Provided: Patient instructed to cont prior HEP.   Exercises were reviewed and Maria D was able to demonstrate them prior to the end of the session.  Maria D demonstrated good  understanding of the education provided.     See EMR under Patient Instructions for exercises provided prior visit.    Assessment     PROM continues to improve with exercises. Her manual ER is only time she experiences discomfort. Her endurance with wrist strengthening has improved with 3#. She will see MD tomorrow and progress as instructed per protocol.     Maria D is progressing well towards her goals.   Pt prognosis is Good.     Pt will continue to benefit from skilled outpatient physical therapy to address the deficits listed in the problem list box on initial evaluation, provide pt/family education and to  maximize pt's level of independence in the home and community environment.     Pt's spiritual, cultural and educational needs considered and pt agreeable to plan of care and goals.    Anticipated barriers to physical therapy: Increased risk for stiffness due to RTC and labral pathology.       Goals:  Short Term Goals: 6 weeks   - Pt will demonstrate excellent knowledge and adherence to HEP to facilitate optimal recovery. (ACHIEVED)  - Pt will demonstrate full PROM per protocol to allow for progression into AROM needed for increased functional use of RUE. (progressing, not me)     Long Term Goals: 16 weeks   - Pt will report FOTO score of 34% limited or less indicating clinically relevant increase in functional tolerance. (progressing, not met)  - Pt will demonstrate full AROM of R shoulder to allow for increased functional activity tolernace (progressing, not met)  - Pt will demonstrate at least 4/5 MMT of R shoulder musculature for increased stability needed for functional activity (progressing, not met)       Plan     Per discussion with MD: Passive range in flexion and scaption, ER to 30 deg. Pendulums ok. AROM/AAROM at 6-7 weeks.    Continue passive range of motion within the limits of the protocol.  No active range of motion for now.  May start that at 7 weeks.  No significant resistive activity for 12 weeks.  No biceps resistance activity for 8 weeks postop.  Must remain in her sling for a total of 7 8 weeks postop.  All questions answered.  Hold her out of work for another 4 weeks.       Continue per protocol to improve mobility.     Sulaiman Cooper, PT, DPT

## 2019-06-06 ENCOUNTER — OFFICE VISIT (OUTPATIENT)
Dept: SPORTS MEDICINE | Facility: CLINIC | Age: 44
End: 2019-06-06
Payer: COMMERCIAL

## 2019-06-06 VITALS
HEIGHT: 71 IN | WEIGHT: 219 LBS | SYSTOLIC BLOOD PRESSURE: 141 MMHG | DIASTOLIC BLOOD PRESSURE: 93 MMHG | BODY MASS INDEX: 30.66 KG/M2 | HEART RATE: 75 BPM

## 2019-06-06 DIAGNOSIS — Z47.89 SURGICAL AFTERCARE, MUSCULOSKELETAL SYSTEM: Primary | ICD-10-CM

## 2019-06-06 PROCEDURE — 99024 PR POST-OP FOLLOW-UP VISIT: ICD-10-PCS | Mod: S$GLB,,, | Performed by: ORTHOPAEDIC SURGERY

## 2019-06-06 PROCEDURE — 99024 POSTOP FOLLOW-UP VISIT: CPT | Mod: S$GLB,,, | Performed by: ORTHOPAEDIC SURGERY

## 2019-06-06 PROCEDURE — 99999 PR PBB SHADOW E&M-EST. PATIENT-LVL III: CPT | Mod: PBBFAC,,, | Performed by: ORTHOPAEDIC SURGERY

## 2019-06-06 PROCEDURE — 99999 PR PBB SHADOW E&M-EST. PATIENT-LVL III: ICD-10-PCS | Mod: PBBFAC,,, | Performed by: ORTHOPAEDIC SURGERY

## 2019-06-07 ENCOUNTER — CLINICAL SUPPORT (OUTPATIENT)
Dept: REHABILITATION | Facility: HOSPITAL | Age: 44
End: 2019-06-07
Payer: COMMERCIAL

## 2019-06-07 DIAGNOSIS — M25.60 RANGE OF MOTION DEFICIT: ICD-10-CM

## 2019-06-07 DIAGNOSIS — R53.1 WEAKNESS: ICD-10-CM

## 2019-06-07 PROCEDURE — 97140 MANUAL THERAPY 1/> REGIONS: CPT

## 2019-06-07 PROCEDURE — 97110 THERAPEUTIC EXERCISES: CPT

## 2019-06-10 ENCOUNTER — CLINICAL SUPPORT (OUTPATIENT)
Dept: REHABILITATION | Facility: HOSPITAL | Age: 44
End: 2019-06-10
Payer: COMMERCIAL

## 2019-06-10 DIAGNOSIS — R53.1 WEAKNESS: ICD-10-CM

## 2019-06-10 DIAGNOSIS — M25.60 RANGE OF MOTION DEFICIT: ICD-10-CM

## 2019-06-10 PROCEDURE — 97110 THERAPEUTIC EXERCISES: CPT

## 2019-06-10 PROCEDURE — 97140 MANUAL THERAPY 1/> REGIONS: CPT

## 2019-06-10 NOTE — PROGRESS NOTES
Physical Therapy Daily Treatment Note     Name: Maria D Reid  [facility] Number: [id number]    Therapy Diagnosis:   Encounter Diagnoses   Name Primary?    Weakness     Range of motion deficit      Physician: Cha Stoll PA-C    Visit Date: 6/10/2019    Physician Orders: PT Eval and Treat   RIGHT shoulder arthroscopic rotator cuff repair, SAD  on 4/24/2019.      POSTOPERATIVE PLAN OF CARE:  -Patient will be discharged home according to protocol.  -Physical Therapy:  Will start therapy right away.  The patient will be at risk for stiffness given her concurrent pathology of both labral and cuff tearing.  Begin passive range of motion now.  No active range of motion for 6-7 weeks.  She will remain in her sling for 7-8 weeks.  No resistive activity for 12 weeks.    Per discussion with MD: Passive range in flexion and scaption, ER to 30 deg. Pendulums ok. AROM/AAROM at 6-7 weeks.    5/7/2019  Continue passive range of motion within the limits of the protocol.  No active range of motion for now.  May start that at 7 weeks.  No significant resistive activity for 12 weeks.  No biceps resistance activity for 8 weeks postop.  Must remain in her sling for a total of 7 8 weeks postop.  All questions answered.  Hold her out of work for another 4 weeks.        Medical Diagnosis from Referral: M75.111 (ICD-10-CM) - Incomplete tear of right rotator cuff  Surgery Date: 4/24/2019  - 6 weeks (AROM) = 6/10/2019  - 12 weeks (Resistance) = 7/22/2019  Evaluation Date: 4/29/2019  Authorization Period Expiration: 12/31/2019  Plan of Care Expiration: 8/19/2019  Visit # / Visits authorized: 15/ 20     Time In: 9:10  Time Out: 10:10  Total Billable Time: 31 minutes     Precautions: HTN      Subjective     Pt reports: shoulder is feeling well, planning on moving to [place] in the next 1-2 weeks.   She was compliant with home exercise program.  Response to previous treatment: Sore  Functional change: Decreasing pain    Pain: Patient unable to  verbalize number  Location: right shoulder      Objective       TREATMENT:   Maria D received therapeutic exercises to develop ROM for 8 minutes including:  - AROM elbow flexion/extension 3'  - AROM pronation/supination 3'  - wrist extension/flexion 3# 3'  - wrist radial deviation/ulnar deviation 3# 3'  - Scapular retraction in chair   - Pendulums 4 ways 2' each  PROGRESS to adding AAROM/AROM next session.    Maria D received the following manual therapy techniques: PROM were applied to the: (R) shoulder for 23 minutes, including:  - PROM flexion and scaption per protocol and pt tolerance.  - PROM ER to 30 degrees in scaption plane as tolerated  - Sidelying scapular framing   - GR I-II oscillations for pain relief    Maria D received hot pack for 10 minutes to (R) shoulder.    Maria D received cold pack for 10 minutes to (R) shoulde to decrease swelling and minimize pain.      Home Exercises Provided and Patient Education Provided     Education provided:   - Reviewed HEP to continue at home    Written Home Exercises Provided: Patient instructed to cont prior HEP.   Exercises were reviewed and Maria D was able to demonstrate them prior to the end of the session.  Maria D demonstrated good  understanding of the education provided.     See EMR under Patient Instructions for exercises provided prior visit.    Assessment     Muscle guarding with PROM continues to decrease. Discussed PT location for when pt moves to [facility], name will be given to surgeon in order to place referral. Pt ready to progress to AAROM/AROM next session. Patient reported feeling well upon departure.       Maria D is progressing well towards her goals.   Pt prognosis is Good.     Pt will continue to benefit from skilled outpatient physical therapy to address the deficits listed in the problem list box on initial evaluation, provide pt/family education and to maximize pt's level of independence in the home and community environment.     Pt's  spiritual, cultural and educational needs considered and pt agreeable to plan of care and goals.    Anticipated barriers to physical therapy: Increased risk for stiffness due to RTC and labral pathology.       Goals:  Short Term Goals: 6 weeks   - Pt will demonstrate excellent knowledge and adherence to HEP to facilitate optimal recovery. (ACHIEVED)  - Pt will demonstrate full PROM per protocol to allow for progression into AROM needed for increased functional use of RUE. (progressing, not me)     Long Term Goals: 16 weeks   - Pt will report FOTO score of 34% limited or less indicating clinically relevant increase in functional tolerance. (progressing, not met)  - Pt will demonstrate full AROM of R shoulder to allow for increased functional activity tolernace (progressing, not met)  - Pt will demonstrate at least 4/5 MMT of R shoulder musculature for increased stability needed for functional activity (progressing, not met)       Plan     AAROM/AROM next session    Continue per protocol to improve mobility.     Cony Miller, PT, DPT

## 2019-06-10 NOTE — PROGRESS NOTES
Physical Therapy Daily Treatment Note     Name: Maria D Reid  [facility] Number: [id number]    Therapy Diagnosis:   Encounter Diagnoses   Name Primary?    Weakness     Range of motion deficit      Physician: Cha Stoll PA-C    Visit Date: 6/7/2019    Physician Orders: PT Eval and Treat   RIGHT shoulder arthroscopic rotator cuff repair, SAD  on 4/24/2019.      POSTOPERATIVE PLAN OF CARE:  -Patient will be discharged home according to protocol.  -Physical Therapy:  Will start therapy right away.  The patient will be at risk for stiffness given her concurrent pathology of both labral and cuff tearing.  Begin passive range of motion now.  No active range of motion for 6-7 weeks.  She will remain in her sling for 7-8 weeks.  No resistive activity for 12 weeks.    Per discussion with MD: Passive range in flexion and scaption, ER to 30 deg. Pendulums ok. AROM/AAROM at 6-7 weeks.    5/7/2019  Continue passive range of motion within the limits of the protocol.  No active range of motion for now.  May start that at 7 weeks.  No significant resistive activity for 12 weeks.  No biceps resistance activity for 8 weeks postop.  Must remain in her sling for a total of 7 8 weeks postop.  All questions answered.  Hold her out of work for another 4 weeks.        Medical Diagnosis from Referral: M75.111 (ICD-10-CM) - Incomplete tear of right rotator cuff  Surgery Date: 4/24/2019  - 6 weeks (AROM) = 6/10/2019  - 12 weeks (Resistance) = 7/22/2019  Evaluation Date: 4/29/2019  Authorization Period Expiration: 12/31/2019  Plan of Care Expiration: 8/19/2019  Visit # / Visits authorized: 14/ 20     Time In: 1:00  Time Out: 2:00  Total Billable Time:31 minutes     Precautions: HTN      Subjective     Pt reports: Shoulder is feeling pretty well. She's been cleared to remove the pillow from her sling.   She was compliant with home exercise program.  Response to previous treatment: Sore  Functional change: Decreasing pain    Pain:  Patient unable to verbalize number  Location: right shoulder      Objective     TREATMENT:   Maria D received therapeutic exercises to develop ROM for 23 minutes including:  - AROM elbow flexion/extension 3'  - AROM pronation/supination 3'  - wrist extension/flexion 3# 3'  - wrist radial deviation/ulnar deviation 3# 3'  - Scapular retraction in chair   - Pendulums 4 ways 2' each    Maria D received the following manual therapy techniques: PROM were applied to the: (R) shoulder for 8 minutes, including:  - PROM flexion and scaption per protocol and pt tolerance.  - PROM ER to 30 degrees in scaption plane as tolerated  - Sidelying scapular framing   - GR I-II oscillations for pain relief    Maria D received hot pack for 0 minutes to (R) shoulder.    Maria D received cold pack for 0 minutes to (R) shoulde to decrease swelling and minimize pain.      Home Exercises Provided and Patient Education Provided     Education provided:   - Reviewed HEP to continue at home    Written Home Exercises Provided: Patient instructed to cont prior HEP.   Exercises were reviewed and Maria D was able to demonstrate them prior to the end of the session.  Maria D demonstrated good  understanding of the education provided.     See EMR under Patient Instructions for exercises provided prior visit.    Assessment     PROM ER continues to increase with no discomfort reported. Her endurance with wrist strengthening has improved with 3#. Patient reported feeling well upon departure.     Maria D is progressing well towards her goals.   Pt prognosis is Good.     Pt will continue to benefit from skilled outpatient physical therapy to address the deficits listed in the problem list box on initial evaluation, provide pt/family education and to maximize pt's level of independence in the home and community environment.     Pt's spiritual, cultural and educational needs considered and pt agreeable to plan of care and goals.    Anticipated barriers to physical  therapy: Increased risk for stiffness due to RTC and labral pathology.       Goals:  Short Term Goals: 6 weeks   - Pt will demonstrate excellent knowledge and adherence to HEP to facilitate optimal recovery. (ACHIEVED)  - Pt will demonstrate full PROM per protocol to allow for progression into AROM needed for increased functional use of RUE. (progressing, not me)     Long Term Goals: 16 weeks   - Pt will report FOTO score of 34% limited or less indicating clinically relevant increase in functional tolerance. (progressing, not met)  - Pt will demonstrate full AROM of R shoulder to allow for increased functional activity tolernace (progressing, not met)  - Pt will demonstrate at least 4/5 MMT of R shoulder musculature for increased stability needed for functional activity (progressing, not met)       Plan     Per discussion with MD: Passive range in flexion and scaption, ER to 30 deg. Pendulums ok. AROM/AAROM at 6-7 weeks.    Continue passive range of motion within the limits of the protocol.  No active range of motion for now.  May start that at 7 weeks.  No significant resistive activity for 12 weeks.  No biceps resistance activity for 8 weeks postop.  Must remain in her sling for a total of 7 8 weeks postop.  All questions answered.  Hold her out of work for another 4 weeks.       Continue per protocol to improve mobility.     Cony Miller, PT, DPT

## 2019-06-11 ENCOUNTER — TELEPHONE (OUTPATIENT)
Dept: SPORTS MEDICINE | Facility: CLINIC | Age: 44
End: 2019-06-11

## 2019-06-11 NOTE — TELEPHONE ENCOUNTER
Spoke with the patient. She is moving to the [facility] area. I provided her with the contact information for Dr. David Foote's office. She will contact his office & he will assist with the referral for physical therapy in that area & continued follow up for the shoulder is she is unable to make visits back to [place].

## 2019-06-13 ENCOUNTER — CLINICAL SUPPORT (OUTPATIENT)
Dept: REHABILITATION | Facility: HOSPITAL | Age: 44
End: 2019-06-13
Payer: COMMERCIAL

## 2019-06-13 DIAGNOSIS — M25.60 RANGE OF MOTION DEFICIT: ICD-10-CM

## 2019-06-13 DIAGNOSIS — R53.1 WEAKNESS: ICD-10-CM

## 2019-06-13 PROCEDURE — 97140 MANUAL THERAPY 1/> REGIONS: CPT

## 2019-06-13 PROCEDURE — 97110 THERAPEUTIC EXERCISES: CPT

## 2019-06-13 NOTE — PROGRESS NOTES
Physical Therapy Daily Treatment Note     Name: Maria D Reid  [facility] Number: [id number]    Therapy Diagnosis:   Encounter Diagnoses   Name Primary?    Weakness     Range of motion deficit      Physician: Cha Stoll PA-C    Visit Date: 6/13/2019    Physician Orders: PT Eval and Treat   RIGHT shoulder arthroscopic rotator cuff repair, SAD  on 4/24/2019.      POSTOPERATIVE PLAN OF CARE:  -Patient will be discharged home according to protocol.  -Physical Therapy:  Will start therapy right away.  The patient will be at risk for stiffness given her concurrent pathology of both labral and cuff tearing.  Begin passive range of motion now.  No active range of motion for 6-7 weeks.  She will remain in her sling for 7-8 weeks.  No resistive activity for 12 weeks.    Per discussion with MD: Passive range in flexion and scaption, ER to 30 deg. Pendulums ok. AROM/AAROM at 6-7 weeks.    5/7/2019  Continue passive range of motion within the limits of the protocol.  No active range of motion for now.  May start that at 7 weeks.  No significant resistive activity for 12 weeks.  No biceps resistance activity for 8 weeks postop.  Must remain in her sling for a total of 7 8 weeks postop.  All questions answered.  Hold her out of work for another 4 weeks.        Medical Diagnosis from Referral: M75.111 (ICD-10-CM) - Incomplete tear of right rotator cuff  Surgery Date: 4/24/2019  - 6 weeks (AROM) = 6/10/2019  - 12 weeks (Resistance) = 7/22/2019  Evaluation Date: 4/29/2019  Authorization Period Expiration: 12/31/2019  Plan of Care Expiration: 8/19/2019  Visit # / Visits authorized: 16/ 20     Time In: 2:08  Time Out: 3:00  Total Billable Time: 31 minutes     Precautions: HTN      Subjective     Pt reports: shoulder is feeling well, today will be her last session before moving to [place].   She was compliant with home exercise program.  Response to previous treatment: Sore  Functional change: Decreasing pain    Pain: Patient unable  to verbalize number  Location: right shoulder      Objective       TREATMENT:   Maria D received therapeutic exercises to develop ROM for 23 minutes including:  - UBE 2' F/B for increased ROM and circulation  - AAROM wand flexion (supine), scaption (standing), ER (standing) 4x10 each   - table slides flexion 4x10   - wall slides 4x10     Not Performed:   - AROM elbow flexion/extension 3'  - AROM pronation/supination 3'  - wrist extension/flexion 3# 3'  - wrist radial deviation/ulnar deviation 3# 3'  - Scapular retraction in chair   - Pendulums 4 ways 2' each      Maria D received the following manual therapy techniques: PROM were applied to the: (R) shoulder for 8 minutes, including:  - PROM flexion and scaption per protocol and pt tolerance.  - PROM ER to 30 degrees in scaption plane as tolerated  - Sidelying scapular framing   - GR I-II oscillations for pain relief    Maria D received cold pack for 10 minutes to (R) shoulde to decrease swelling and minimize pain.      Home Exercises Provided and Patient Education Provided     Education provided:   - Reviewed HEP to continue at home    Written Home Exercises Provided: Patient instructed to cont prior HEP.   Exercises were reviewed and Maria D was able to demonstrate them prior to the end of the session.  Maria D demonstrated good  understanding of the education provided.     See EMR under Patient Instructions for exercises provided prior visit.    Assessment     Exercises progressed to include AAROM/AROM ex's for (R) shoulder per surgical protocol; VC required for proper form and no shoulder hike. No pain provocation reported; slight discomfort with eccentric lowering during supine flexion with wand. Added wall slides in shower to HEP. Patient reported feeling well upon departure.    Patient is appropriate to be discharged and continue with independent HEP at this time; she plans to resume care with new PT in [facility] area of [place] upon moving and obtaining new referral.  Patient instructed to contact PT with any questions or concerns following discharge.      Maria D is progressing well towards her goals.   Pt prognosis is Good.     Pt will continue to benefit from skilled outpatient physical therapy to address the deficits listed in the problem list box on initial evaluation, provide pt/family education and to maximize pt's level of independence in the home and community environment.     Pt's spiritual, cultural and educational needs considered and pt agreeable to plan of care and goals.    Anticipated barriers to physical therapy: Increased risk for stiffness due to RTC and labral pathology.       Goals:  Short Term Goals: 6 weeks   - Pt will demonstrate excellent knowledge and adherence to HEP to facilitate optimal recovery. (ACHIEVED)  - Pt will demonstrate full PROM per protocol to allow for progression into AROM needed for increased functional use of RUE. (progressing, not me)     Long Term Goals: 16 weeks   - Pt will report FOTO score of 34% limited or less indicating clinically relevant increase in functional tolerance. (progressing, not met)  - Pt will demonstrate full AROM of R shoulder to allow for increased functional activity tolernace (progressing, not met)  - Pt will demonstrate at least 4/5 MMT of R shoulder musculature for increased stability needed for functional activity (progressing, not met)       Plan     Patient is appropriate to be discharged and continue with independent HEP at this time; she plans to resume care [place] new PT [place] area of [place] upon moving and obtaining new referral. Patient instructed to contact PT with any questions or concerns following discharge.      Cony Miller, PT, DPT

## 2019-06-25 ENCOUNTER — PATIENT MESSAGE (OUTPATIENT)
Dept: ADMINISTRATIVE | Facility: OTHER | Age: 44
End: 2019-06-25

## 2019-06-26 ENCOUNTER — TELEPHONE (OUTPATIENT)
Dept: SPORTS MEDICINE | Facility: CLINIC | Age: 44
End: 2019-06-26

## 2019-06-26 NOTE — TELEPHONE ENCOUNTER
----- Message from Apurva Cazares sent at 6/26/2019  9:59 AM CDT -----  Contact: Self  Pt requesting a call back regarding her sling that she would like to remove since she's past the 8 week darien [phone]

## 2019-06-26 NOTE — TELEPHONE ENCOUNTER
Returned pt call after reading Dr. Milligan's Op-Note and Post Op note from 06/06/19 which stated she only had to wear sling for 2 more weeks. I relayed the message to patient and she was very excited!

## 2019-07-08 ENCOUNTER — TELEPHONE (OUTPATIENT)
Dept: SPORTS MEDICINE | Facility: CLINIC | Age: 44
End: 2019-07-08

## 2019-07-08 NOTE — TELEPHONE ENCOUNTER
----- Message from Ana Jeffers sent at 7/8/2019  1:51 PM CDT -----  Contact: Self/ [phone]      ----- Message -----  From: Felix Garnica  Sent: 7/8/2019  11:04 AM  To: [facility] De [facility] Staff    Patient would like a call back to get a appt for the week of 7/29/19 for a f/u for her right shoulder. No appts are open for the week of 7/29/19.

## 2019-07-29 ENCOUNTER — OFFICE VISIT (OUTPATIENT)
Dept: SPORTS MEDICINE | Facility: CLINIC | Age: 44
End: 2019-07-29
Payer: COMMERCIAL

## 2019-07-29 VITALS
HEART RATE: 76 BPM | BODY MASS INDEX: 31.5 KG/M2 | HEIGHT: 71 IN | SYSTOLIC BLOOD PRESSURE: 136 MMHG | WEIGHT: 225 LBS | DIASTOLIC BLOOD PRESSURE: 85 MMHG

## 2019-07-29 DIAGNOSIS — Z98.890 STATUS POST ROTATOR CUFF REPAIR: ICD-10-CM

## 2019-07-29 DIAGNOSIS — M25.611 SHOULDER STIFFNESS, RIGHT: Primary | ICD-10-CM

## 2019-07-29 PROCEDURE — 3008F BODY MASS INDEX DOCD: CPT | Mod: CPTII,S$GLB,, | Performed by: ORTHOPAEDIC SURGERY

## 2019-07-29 PROCEDURE — 99213 OFFICE O/P EST LOW 20 MIN: CPT | Mod: S$GLB,,, | Performed by: ORTHOPAEDIC SURGERY

## 2019-07-29 PROCEDURE — 3008F PR BODY MASS INDEX (BMI) DOCUMENTED: ICD-10-PCS | Mod: CPTII,S$GLB,, | Performed by: ORTHOPAEDIC SURGERY

## 2019-07-29 PROCEDURE — 99999 PR PBB SHADOW E&M-EST. PATIENT-LVL III: ICD-10-PCS | Mod: PBBFAC,,, | Performed by: ORTHOPAEDIC SURGERY

## 2019-07-29 PROCEDURE — 99213 PR OFFICE/OUTPT VISIT, EST, LEVL III, 20-29 MIN: ICD-10-PCS | Mod: S$GLB,,, | Performed by: ORTHOPAEDIC SURGERY

## 2019-07-29 PROCEDURE — 99999 PR PBB SHADOW E&M-EST. PATIENT-LVL III: CPT | Mod: PBBFAC,,, | Performed by: ORTHOPAEDIC SURGERY

## 2019-07-29 NOTE — PROGRESS NOTES
CC: RIGHT shoulder pain    DATE OF PROCEDURE: 4/24/2019  OPERATION:   Right   1. Shoulder arthroscopic Bankart repair   2. Shoulder arthroscopic posterior inferior labral repair  3. Shoulder arthroscopic rotator cuff repair  4. Shoulder arthroscopic extensive debridement (anterior, posterior glenohumeral joint, subacromial space)   5. Shoulder arthroscopic subacromial bursectomy with decompression  6. Shoulder non-operative treatment of AC joint separation    S: The patient returns today 3 months s/p the above mentioned procedure. States she continues making good progress in physical therapy. Denies any real significant pain.  The shoulder feels better from preop in that regard. No instability symptoms. She has relocated to [facility] and has established care with Dr. David Foote.  She will travel back and forth between here and there during that time.    Pain Score:   1    PAST MEDICAL HISTORY:   Past Medical History:   Diagnosis Date    Allergy     Hypertension        PAST SURGICAL HISTORY:  Past Surgical History:   Procedure Laterality Date    ARTHROSCOPY, SHOULDER, WITH BANKART REPAIR Right 4/24/2019    Performed by [facility]igan [facility] at [facility] OR 2ND FLR    ARTHROSCOPY, SHOULDER,ROTATOR CUFF REPAIR Right 4/24/2019    Per[facility] at [facility] OR 2ND FLR    CAPSULORRHAPHY Right 4/24/2019    Per[facility] at [facility] OR 2ND FLR    DECOMPRESSION, SUBACROMIAL SPACE Right 4/24/2019    Performed by [facility] at [facility] OR 2ND FLR    GASTRIC BYPASS      KNEE SURGERY Right        FAMILY HISTORY:  Family History   Problem Relation Age of Onset    Hypertension Mother        MEDICATIONS:    Current Outpatient Medications:     amLODIPine (NORVASC) 5 MG tablet, TK 1 T PO QD, Disp: , Rfl: 1    azelastine (ASTELIN) 137 mcg (0.1 %) nasal spray, 1 spray by Nasal route 2 (two) times daily as needed. , Disp: , Rfl:     cetirizine (ZYRTEC) 10 MG tablet, Take 10 mg by  mouth once daily., Disp: , Rfl:     chlorzoxazone (PARAFON FORTE) 500 mg Tab, Take 2 tabs PO TID, Disp: 60 tablet, Rfl: 0    DYMISTA 137-50 mcg/spray [place] nassal spray, SHAKE WELL AND PALCE 1 SPRAY IEN BID  prn, Disp: , Rfl: 1    FLUTICASONE PROPIONATE (FLONASE NASL), by Nasal route daily as needed. , Disp: , Rfl:     HYDROcodone-acetaminophen (NORCO) 5-325 mg per tablet, Take 1 tablet by mouth every 6 (six) hours as needed for Pain., Disp: 10 tablet, Rfl: 0    ibuprofen (ADVIL,MOTRIN) 800 MG tablet, Take 1 tablet (800 mg total) by mouth every 6 (six) hours as needed for Pain., Disp: 30 tablet, Rfl: 0    LAYOLIS FE 0.8mg-25mcg(24) and 75 mg (4) Chew, CSW 1 T PO QD, Disp: , Rfl: 1    meloxicam (MOBIC) 15 MG tablet, Take 15 mg by mouth once daily., Disp: , Rfl:     olmesartan-hydrochlorothiazide (BENICAR HCT) 20-12.5 mg per tablet, TK 1 T PO QD, Disp: , Rfl: 0    oxyCODONE (ROXICODONE) 5 MG immediate release tablet, Take 1-2 tablets (5-10 mg total) by mouth every 6 (six) hours as needed for Pain., Disp: 28 tablet, Rfl: 0    rizatriptan (MAXALT-MLT) 10 MG disintegrating tablet, Take 10 mg by mouth as needed for Migraine. May repeat in 2 hours if needed, Disp: , Rfl:     tiZANidine (ZANAFLEX) 4 MG tablet, TK 1 T PO  BID PRF SPASM/ PAIN RELIEF. DO NOT TAKE WITH TRAMADOL, Disp: , Rfl: 2    aspirin (ECOTRIN) 81 MG EC tablet, Take 1 tablet (81 mg total) by mouth 2 (two) times daily. for 14 days, Disp: 28 tablet, Rfl: 0    ALLERGIES:  Review of patient's allergies indicates:  No Known Allergies       REVIEW OF SYSTEMS:  Constitution: Negative. Negative for chills, fever and night sweats.    Hematologic/Lymphatic: Negative for bleeding problem. Does not bruise/bleed easily.   Skin: Negative for dry skin, itching and rash.   Musculoskeletal: Negative for falls. Positive for right shoulder pain and  muscle weakness.     All other review of symptoms were reviewed and found to be noncontributory.     PHYSICAL  "EXAMINATION:  Vitals:  /85   Pulse 76   Ht 5' 11" (1.803 m)   Wt 102.1 kg (225 lb)   BMI 31.38 kg/m²    General: Well-developed well-nourished 43 y.o. femalein no acute distress   Cardiovascular: Regular rhythm by palpation of distal pulse, normal color and temperature, no concerning varicosities on symptomatic side   Lungs: No labored breathing or wheezing appreciated   Neuro: Alert and oriented ×3   Psychiatric: well oriented to person, place and time, demonstrates normal mood and affect   Skin: No rashes, lesions or ulcers, normal temperature, turgor, and texture on uninvolved extremity    Ortho/SPM Exam  Examination of the right shoulder demonstrates The incisions are well healed.  No significant tenderness.  Active forward elevation to 170° without pain.  Active ER with arm at side to 30 degrees, Passive external rotation with arm at side to 40° with appropriate endpoint.  IR to L5. Mild TTP over AC joint with prominence as existed preop, worse with cross body maneuver. No scapular winging.  5/-5 resisted cuff strength. Full elbow range of motion. NVI.    IMAGING:  No further imaging obtained today      ASSESSMENT:      ICD-10-CM ICD-9-CM   1. Shoulder stiffness, right M25.611 719.51   2. Status post rotator cuff repair Z98.890 V45.89     PLAN:     She is clinically doing quite well.  This is a unique case with combined significant labral and rotator cuff tearing.  Range of motion has improved and she denies any significant pain.. She should continue to limit weightbearing/lifting to less than 10-15 lbs for 6 more weeks given the cuff repair. No heavy or strenuous overhead lifting.  Continue to work on range of motion and functional strength. I will see her back in 2 months for recheck.  All questions answered.    Procedures    "

## 2019-09-30 ENCOUNTER — TELEPHONE (OUTPATIENT)
Dept: SPORTS MEDICINE | Facility: CLINIC | Age: 44
End: 2019-09-30

## 2019-09-30 NOTE — TELEPHONE ENCOUNTER
----- Message from Sylvia Frankel sent at 9/30/2019 11:20 AM CDT -----  Contact: James [phone]  Carl madhu Ramirez states he has been trying to send a fax states it is not going through please call back to discuss in regards to pt therapy

## 2019-09-30 NOTE — TELEPHONE ENCOUNTER
Returned James's call and he stated the fax wasn't working on his end. They are trying to fix and then he will refax.

## 2019-10-01 ENCOUNTER — OFFICE VISIT (OUTPATIENT)
Dept: SPORTS MEDICINE | Facility: CLINIC | Age: 44
End: 2019-10-01
Payer: COMMERCIAL

## 2019-10-01 VITALS
DIASTOLIC BLOOD PRESSURE: 88 MMHG | BODY MASS INDEX: 31.5 KG/M2 | HEART RATE: 97 BPM | HEIGHT: 71 IN | SYSTOLIC BLOOD PRESSURE: 147 MMHG | WEIGHT: 225 LBS

## 2019-10-01 DIAGNOSIS — M25.611 SHOULDER STIFFNESS, RIGHT: Primary | ICD-10-CM

## 2019-10-01 PROCEDURE — 3008F BODY MASS INDEX DOCD: CPT | Mod: CPTII,S$GLB,, | Performed by: ORTHOPAEDIC SURGERY

## 2019-10-01 PROCEDURE — 99213 OFFICE O/P EST LOW 20 MIN: CPT | Mod: S$GLB,,, | Performed by: ORTHOPAEDIC SURGERY

## 2019-10-01 PROCEDURE — 3008F PR BODY MASS INDEX (BMI) DOCUMENTED: ICD-10-PCS | Mod: CPTII,S$GLB,, | Performed by: ORTHOPAEDIC SURGERY

## 2019-10-01 PROCEDURE — 99999 PR PBB SHADOW E&M-EST. PATIENT-LVL III: CPT | Mod: PBBFAC,,, | Performed by: ORTHOPAEDIC SURGERY

## 2019-10-01 PROCEDURE — 99213 PR OFFICE/OUTPT VISIT, EST, LEVL III, 20-29 MIN: ICD-10-PCS | Mod: S$GLB,,, | Performed by: ORTHOPAEDIC SURGERY

## 2019-10-01 PROCEDURE — 99999 PR PBB SHADOW E&M-EST. PATIENT-LVL III: ICD-10-PCS | Mod: PBBFAC,,, | Performed by: ORTHOPAEDIC SURGERY

## 2023-02-03 ENCOUNTER — OFFICE VISIT (OUTPATIENT)
Dept: PRIMARY CARE CLINIC | Facility: CLINIC | Age: 48
End: 2023-02-03
Payer: COMMERCIAL

## 2023-02-03 DIAGNOSIS — Z78.9 USES BIRTH CONTROL: ICD-10-CM

## 2023-02-03 DIAGNOSIS — J01.10 ACUTE NON-RECURRENT FRONTAL SINUSITIS: Primary | ICD-10-CM

## 2023-02-03 PROCEDURE — 99214 PR OFFICE/OUTPT VISIT, EST, LEVL IV, 30-39 MIN: ICD-10-PCS | Mod: 95,,, | Performed by: INTERNAL MEDICINE

## 2023-02-03 PROCEDURE — 99214 OFFICE O/P EST MOD 30 MIN: CPT | Mod: 95,,, | Performed by: INTERNAL MEDICINE

## 2023-02-03 PROCEDURE — 1160F RVW MEDS BY RX/DR IN RCRD: CPT | Mod: CPTII,95,, | Performed by: INTERNAL MEDICINE

## 2023-02-03 PROCEDURE — 1159F MED LIST DOCD IN RCRD: CPT | Mod: CPTII,95,, | Performed by: INTERNAL MEDICINE

## 2023-02-03 PROCEDURE — 1159F PR MEDICATION LIST DOCUMENTED IN MEDICAL RECORD: ICD-10-PCS | Mod: CPTII,95,, | Performed by: INTERNAL MEDICINE

## 2023-02-03 PROCEDURE — 1160F PR REVIEW ALL MEDS BY PRESCRIBER/CLIN PHARMACIST DOCUMENTED: ICD-10-PCS | Mod: CPTII,95,, | Performed by: INTERNAL MEDICINE

## 2023-02-03 RX ORDER — METHYLPREDNISOLONE 4 MG/1
TABLET ORAL
Qty: 21 EACH | Refills: 0 | Status: SHIPPED | OUTPATIENT
Start: 2023-02-03 | End: 2023-02-24

## 2023-02-03 RX ORDER — AZITHROMYCIN 250 MG/1
TABLET, FILM COATED ORAL
Qty: 6 TABLET | Refills: 0 | Status: SHIPPED | OUTPATIENT
Start: 2023-02-03 | End: 2023-02-08

## 2023-02-03 RX ORDER — BROMPHENIRAMINE MALEATE, PSEUDOEPHEDRINE HYDROCHLORIDE, AND DEXTROMETHORPHAN HYDROBROMIDE 2; 30; 10 MG/5ML; MG/5ML; MG/5ML
5 SYRUP ORAL 4 TIMES DAILY PRN
Qty: 118 ML | Refills: 0 | Status: SHIPPED | OUTPATIENT
Start: 2023-02-03 | End: 2023-02-13

## 2023-02-03 RX ORDER — NORETHINDRONE, ETHINYL ESTRADIOL, AND FERROUS FUMARATE 0.8-25(24)
KIT ORAL
Qty: 30 TABLET | Refills: 0 | Status: SHIPPED | OUTPATIENT
Start: 2023-02-03

## 2023-02-03 NOTE — PROGRESS NOTES
[facility] Note  The patient location is: home  The chief complaint leading to consultation is: sinus issues    Visit type: audiovisual    Face to Face time with patient: 12 min  15 minutes of total time spent on the encounter, which includes face to face time and non-face to face time preparing to see the patient (eg, review of tests), Obtaining and/or reviewing separately obtained history, Documenting clinical information in the electronic or other health record, Independently interpreting results (not separately reported) and communicating results to the patient/family/caregiver, or Care coordination (not separately reported).         Each patient to whom he or she provides medical services by telemedicine is:  (1) informed of the relationship between the physician and patient and the respective role of any other health care provider with respect to management of the patient; and (2) notified that he or she may decline to receive medical services by telemedicine and may withdraw from such care at any time.    Notes:    Chief Complaint      Chief Complaint   Patient presents with    Sinus Problem       History of Present Illness      Maria D Reid is a 47 y.o. female who presents today for   Chief Complaint   Patient presents with    Sinus Problem   .  Patient comes to appointment here started with sore throat on Sunday , sinus congestion and pain , purulent drainage as well , no fever . Has taken 2 covid tests most recent is negative     Problem List Items Addressed This Visit          ENT    Acute non-recurrent frontal sinusitis - Primary    Overview     zpak   Medrol dose pack  bromphed            Other    Uses birth control    Overview     Refilled               Past Medical History:  Past Medical History:   Diagnosis Date    Allergy     Hypertension        Past Surgical History:  Past Surgical History:   Procedure Laterality Date    DECOMPRESSION OF SUBACROMIAL SPACE Right  4/24/2019    Procedure: DECOMPRESSION, SUBACROMIAL SPACE;  Surgeon: JEREMÍAS Milligan MD;  Location: [facility] OR [address] [place] [address];  Service: Orthopedics;  Laterality: Right;    GASTRIC BYPASS      KNEE SURGERY Right     SHOULDER ARTHROSCOPY Right 4/24/2019    Procedure: ARTHROSCOPY, SHOULDER,ROTATOR CUFF REPAIR;  Surgeon: JEREMÍAS Milligan MD;  [facility];  Service: Orthopedics;  Laterality: Right;  REGIONAL W/CATHETER INTERSCALENE    SHOULDER ARTHROSCOPY W/ BANKHART PROCEDURE Right 4/24/2019    Procedure: ARTHROSCOPY, SHOULDER, WITH BANKART REPAIR;  Surgeon: JEREMÍAS Milligan MD;  Location: [facility] OR [address] [place] [address];  Service: Orthopedics;  Laterality: Right;       Family History:  family history includes Hypertension in her mother.    Social History:  Social History     Socioeconomic History    Marital status: Single   Tobacco Use    Smoking status: Never    Smokeless tobacco: Never   Substance and Sexual Activity    Alcohol use: Yes    Drug use: Never       Review of Systems:   Review of Systems   Constitutional:  Negative for chills, fever and weight loss.   HENT:  Positive for sore throat. Negative for ear pain.    Respiratory:  Positive for cough. Negative for hemoptysis, shortness of breath and wheezing.    Cardiovascular:  Negative for chest pain.   Gastrointestinal:  Negative for heartburn.   Musculoskeletal:  Negative for myalgias.   Skin:  Negative for rash.   Neurological:  Positive for headaches.   Endo/Heme/Allergies:  Positive for environmental allergies.       Medications:  Outpatient Encounter Medications as of 2/3/2023   Medication Sig Note Dispense Refill    amLODIPine (NORVASC) 5 MG tablet TK 1 T PO QD 4/23/2019: Take AM of surgery  1    aspirin (ECOTRIN) 81 MG EC tablet Take 1 tablet (81 mg total) by mouth 2 (two) times daily. for 14 days 4/23/2019: For post op 28 tablet 0    azelastine (ASTELIN) 137 mcg (0.1 %) nasal spray 1 spray by Nasal route 2 (two) times daily as needed.  4/23/2019: Use as needed       azithromycin (Z-JESS) 250 MG tablet Take 2 tablets by mouth on day 1; Take 1 tablet by mouth on days 2-5  6 tablet 0    brompheniramine-pseudoeph-DM (BROMFED DM) 2-30-10 mg/5 mL Syrp Take 5 mLs by mouth 4 (four) times daily as needed.  118 mL 0    cetirizine (ZYRTEC) 10 MG tablet Take 10 mg by mouth once daily. 4/23/2019: Hold AM of surgery      chlorzoxazone (PARAFON FORTE) 500 mg Tab Take 2 tabs PO TID (Patient not taking: Reported on 10/1/2019)  60 tablet 0    DYMISTA 137-50 mcg/spray [place] nassal spray SHAKE WELL AND PALCE 1 SPRAY IEN BID  prn 4/23/2019: Use as needed  1    FLUTICASONE PROPIONATE (FLONASE NASL) by Nasal route daily as needed.  4/23/2019: Use as needed      HYDROcodone-acetaminophen (NORCO) 5-325 mg per tablet Take 1 tablet by mouth every 6 (six) hours as needed for Pain. (Patient not taking: Reported on 10/1/2019) 4/23/2019: Not taking 10 tablet 0    ibuprofen (ADVIL,MOTRIN) 800 MG tablet Take 1 tablet (800 mg total) by mouth every 6 (six) hours as needed for Pain. 4/23/2019: Continue to hold until after surgery 30 tablet 0    LAYOLIS FE 0.8mg-25mcg(24) and 75 mg (4) Chew CSW 1 T PO QD  30 tablet 0    meloxicam (MOBIC) 15 MG tablet Take 15 mg by mouth once daily. 4/23/2019: Continue to hold until after surgery      methylPREDNISolone (MEDROL DOSEPACK) 4 mg tablet use as directed  21 each 0    olmesartan-hydrochlorothiazide (BENICAR HCT) 20-12.5 mg per tablet TK 1 T PO QD 4/23/2019: Hold AM of surgery  0    oxyCODONE (ROXICODONE) 5 MG immediate release tablet Take 1-2 tablets (5-10 mg total) by mouth every 6 (six) hours as needed for Pain. (Patient not taking: Reported on 10/1/2019) 4/23/2019: For post op 28 tablet 0    rizatriptan (MAXALT-MLT) 10 MG disintegrating tablet Take 10 mg by mouth as needed for Migraine. May repeat in 2 hours if needed 4/23/2019: Take as needed      tiZANidine (ZANAFLEX) 4 MG tablet TK 1 T PO  BID PRF SPASM/ PAIN RELIEF. DO NOT TAKE WITH TRAMADOL 4/23/2019: Take as  needed  2    [DISCONTINUED] LAYOLIS FE 0.8mg-25mcg(24) and 75 mg (4) Chew CSW 1 T PO QD 4/23/2019: Take AM of surgery  1     No facility-administered encounter medications on file as of 2/3/2023.        Allergies:  Review of patient's allergies indicates:  No Known Allergies      Physical Exam       There were no vitals filed for this visit.      Physical Exam  Constitutional:       General: She is not in acute distress.     Appearance: Normal appearance. She is not ill-appearing.   Eyes:      General:         Right eye: No discharge.         Left eye: No discharge.      Extraocular Movements: Extraocular movements intact.      Pupils: Pupils are equal, round, and reactive to light.   Pulmonary:      Effort: Pulmonary effort is normal.   Neurological:      General: No focal deficit present.      Mental Status: She is alert and oriented to person, place, and time.   Psychiatric:         Mood and Affect: Mood normal.         Behavior: Behavior normal.         Thought Content: Thought content normal.         Judgment: Judgment normal.        Laboratory:  CBC:  No results for input(s): WBC, RBC, HGB, HCT, PLT, MCV, MCH, MCHC in the last 2160 hours.  CMP:  No results for input(s): GLU, CALCIUM, ALBUMIN, PROT, NA, K, CO2, CL, BUN, ALKPHOS, ALT, AST, BILITOT in the last 2160 hours.    Invalid input(s): CREATININ  URINALYSIS:  No results for input(s): COLORU, CLARITYU, SPECGRAV, PHUR, PROTEINUA, GLUCOSEU, BILIRUBINCON, BLOODU, WBCU, RBCU, BACTERIA, MUCUS, NITRITE, LEUKOCYTESUR, UROBILINOGEN, HYALINECASTS in the last 2160 hours.   LIPIDS:  No results for input(s): TSH, HDL, CHOL, TRIG, LDLCALC, CHOLHDL, NONHDLCHOL, TOTALCHOLEST in the last 2160 hours.  TSH:  No results for input(s): TSH in the last 2160 hours.  A1C:  No results for input(s): HGBA1C in the last 2160 hours.    Radiology:        Assessment:     Maria D Reid is a 47 y.o.female with:    Acute non-recurrent frontal sinusitis  -     azithromycin (Z-JESS) 250 MG  tablet; Take 2 tablets by mouth on day 1; Take 1 tablet by mouth on days 2-5  Dispense: 6 tablet; Refill: 0  -     methylPREDNISolone (MEDROL DOSEPACK) 4 mg tablet; use as directed  Dispense: 21 each; Refill: 0  -     brompheniramine-pseudoeph-DM (BROMFED DM) 2-30-10 mg/5 mL Syrp; Take 5 mLs by mouth 4 (four) times daily as needed.  Dispense: 118 mL; Refill: 0    Uses birth control  -     LAYOLIS FE 0.8mg-25mcg(24) and 75 mg (4) Chew; CSW 1 T PO QD  Dispense: 30 tablet; Refill: 0          Plan:     Problem List Items Addressed This Visit          ENT    Acute non-recurrent frontal sinusitis - Primary    Overview     zpak   Medrol dose pack  bromphed            Other    Uses birth control    Overview     Refilled             As above, continue current medications and maintain follow up with specialists.  Return to clinic prn [facility]

## 2023-05-08 PROBLEM — J01.10 ACUTE NON-RECURRENT FRONTAL SINUSITIS: Status: RESOLVED | Noted: 2023-02-03 | Resolved: 2023-05-08

## 2024-12-05 NOTE — PROGRESS NOTES
"  Physical Therapy Daily Treatment Note     Name: Maria D Reid  [facility] Number: [id number]    Therapy Diagnosis:   Encounter Diagnoses   Name Primary?    Weakness     Range of motion deficit      Physician: Cha Stoll PA-C    Visit Date: 5/20/2019    Physician Orders: PT Eval and Treat   RIGHT shoulder arthroscopic rotator cuff repair, SAD  on 4/24/2019.      POSTOPERATIVE PLAN OF CARE:  -Patient will be discharged home according to protocol.  -Physical Therapy:  Will start therapy right away.  The patient will be at risk for stiffness given her concurrent pathology of both labral and cuff tearing.  Begin passive range of motion now.  No active range of motion for 6-7 weeks.  She will remain in her sling for 7-8 weeks.  No resistive activity for 12 weeks.    Per discussion with MD: Passive range in flexion and scaption, ER to 30 deg. Pendulums ok. AROM/AAROM at 6-7 weeks.    5/7/2019  Continue passive range of motion within the limits of the protocol.  No active range of motion for now.  May start that at 7 weeks.  No significant resistive activity for 12 weeks.  No biceps resistance activity for 8 weeks postop.  Must remain in her sling for a total of 7 8 weeks postop.  All questions answered.  Hold her out of work for another 4 weeks.        Medical Diagnosis from Referral: M75.111 (ICD-10-CM) - Incomplete tear of right rotator cuff  Surgery Date: 4/24/2019  - 6 weeks (AROM) = 6/10/2019  - 12 weeks (Resistance) = 7/22/2019  Evaluation Date: 4/29/2019  Authorization Period Expiration: 12/31/2019  Plan of Care Expiration: 8/19/2019  Visit # / Visits authorized: 10/ 20     Time In: 1:10  Time Out: 2:10  Total Billable Time: 30 minutes     Precautions: HTN      Subjective     Pt reports: Pain is better today. She still has days where it is "achey" but she's been doing ok.   She was compliant with home exercise program.  Response to previous treatment: Sore  Functional change: Decreasing pain    Pain: Patient " unable to verbalize number  Location: right shoulder      Objective       CMS Impairment/Limitation/Restriction for FOTO Shoulder Survey    Therapist reviewed FOTO scores for Maria D Reid on 5/20/2019.   FOTO documents entered into [employer] - see Media section.    Limitation Score: 66%  Category: Other    Current : CL = least 60% but < 80% impaired, limited or restricted  Goal: CJ = at least 20% but < 40% impaired, limited or restricted       Maria D received therapeutic exercises to develop ROM for 15 minutes including:  - AROM elbow flexion/extension 3'  - AROM pronation/supination 3'  - wrist extension/flexion 3# 3'  - wrist radial deviation/ulnar deviation 3# 3'  - Scapular retraction in chair   - Pendulums 4 ways 2' each    Maria D received the following manual therapy techniques: PROM were applied to the: (R) shoulder for 15 minutes, including:  - PROM flexion and scaption per protocol and pt tolerance.  - PROM ER to 20 degrees as tolerated  - Sidelying scapular framing   - GR I-II oscillations for pain relief    Maria D received hot pack for 10 minutes to (R) shoulder.    Maria D received cold pack for 10 minutes to (R) shoulde to decrease swelling and minimize pain.      Home Exercises Provided and Patient Education Provided     Education provided:   - Reviewed HEP to continue at home    Written Home Exercises Provided: Patient instructed to cont prior HEP.   Exercises were reviewed and Maria D was able to demonstrate them prior to the end of the session.  Maria D demonstrated good  understanding of the education provided.     See EMR under Patient Instructions for exercises provided prior visit.    Assessment     Decreasing muscle guarding noted over recent sessions, persistent guarding still noted with ER within limits of protocol. FOTO score demonstrates steady improvement. Skilled intervention still required to ensure optimal recovery. Patient reported feeling well upon departure.      Maria D is progressing well  towards her goals.   Pt prognosis is Good.     Pt will continue to benefit from skilled outpatient physical therapy to address the deficits listed in the problem list box on initial evaluation, provide pt/family education and to maximize pt's level of independence in the home and community environment.     Pt's spiritual, cultural and educational needs considered and pt agreeable to plan of care and goals.    Anticipated barriers to physical therapy: Increased risk for stiffness due to RTC and labral pathology.       Goals:  Short Term Goals: 6 weeks   - Pt will demonstrate excellent knowledge and adherence to HEP to facilitate optimal recovery. (ACHIEVED)  - Pt will demonstrate full PROM per protocol to allow for progression into AROM needed for increased functional use of RUE. (progressing, not me)     Long Term Goals: 16 weeks   - Pt will report FOTO score of 34% limited or less indicating clinically relevant increase in functional tolerance. (progressing, not met)  - Pt will demonstrate full AROM of R shoulder to allow for increased functional activity tolernace (progressing, not met)  - Pt will demonstrate at least 4/5 MMT of R shoulder musculature for increased stability needed for functional activity (progressing, not met)       Plan     Per discussion with MD: Passive range in flexion and scaption, ER to 30 deg. Pendulums ok. AROM/AAROM at 6-7 weeks.    Continue passive range of motion within the limits of the protocol.  No active range of motion for now.  May start that at 7 weeks.  No significant resistive activity for 12 weeks.  No biceps resistance activity for 8 weeks postop.  Must remain in her sling for a total of 7 8 weeks postop.  All questions answered.  Hold her out of work for another 4 weeks.       Continue per protocol to improve mobility.     Cony Miller, PT, DPT    Detail Level: Detailed Otc Regimen: Start to use Amlactin/ Cerave / Aveeno OTC

## (undated) DEVICE — PAD ABD 8X10 STERILE

## (undated) DEVICE — DRESSING AQUACEL FOAM 3 X 3

## (undated) DEVICE — NDL 18GA X1 1/2 REG BEVEL

## (undated) DEVICE — APPLICATOR CHLORAPREP ORN 26ML

## (undated) DEVICE — PUMP COLD THERAPY

## (undated) DEVICE — CANNULA HEX FLEX 7 X 85

## (undated) DEVICE — TUBE SET INFLOW/OUTFLOW

## (undated) DEVICE — CANNULA PASSPORT 8 MM X 5CM

## (undated) DEVICE — Device

## (undated) DEVICE — CUBE COLD THERAPY POLAR CARE

## (undated) DEVICE — TAPE SURG DURAPORE 2 X10YD

## (undated) DEVICE — SUPPORT SLING SHOT II MEDIUM

## (undated) DEVICE — PROBE ARTHO ENERGY 90 DEG

## (undated) DEVICE — DRAPE STERI-DRAPE 1000 17X11IN

## (undated) DEVICE — SUT LABRALTAPE 1.5X36 WHITE

## (undated) DEVICE — SUT LABRALTAPE 1.5X36 BL/WH

## (undated) DEVICE — SUT LASSO 90DEG CURVE LT

## (undated) DEVICE — CANNULA TRIPLEDAM 8.25MM 7CM

## (undated) DEVICE — DRESSING XEROFORM FOIL PK 1X8

## (undated) DEVICE — BLADE SHAVER 4.5 6/BX

## (undated) DEVICE — KIT SHOULDER POSITIONER SPIDER

## (undated) DEVICE — BRACE SHOULDER SLINGSHOT 3 XLG

## (undated) DEVICE — SEE MEDLINE ITEM 152622

## (undated) DEVICE — DRAPE STERI U-SHAPED 47X51IN

## (undated) DEVICE — SEE MEDLINE ITEM 146313

## (undated) DEVICE — CANNULA TWIST IN 7MM X 7CM

## (undated) DEVICE — DRESSING XEROFORM 1X8IN

## (undated) DEVICE — SOL IRR NACL .9% 3000ML

## (undated) DEVICE — COVER LIGHT HANDLE 80/CA

## (undated) DEVICE — PASSER SUTURE LASSO STRGHT 90

## (undated) DEVICE — SEE MEDLINE ITEM 146417

## (undated) DEVICE — SYR 30CC LUER LOCK

## (undated) DEVICE — BLADE SHAVER LANZA 4.2X13CM

## (undated) DEVICE — DRAPE STERI INSTRUMENT 1018

## (undated) DEVICE — SEE MEDLINE ITEM 152530

## (undated) DEVICE — PAD SHOULDER CARE POLAR

## (undated) DEVICE — NDL SUREFIRE SCORPION RC